# Patient Record
Sex: MALE | Race: BLACK OR AFRICAN AMERICAN | Employment: OTHER | URBAN - METROPOLITAN AREA
[De-identification: names, ages, dates, MRNs, and addresses within clinical notes are randomized per-mention and may not be internally consistent; named-entity substitution may affect disease eponyms.]

---

## 2022-04-21 ENCOUNTER — APPOINTMENT (OUTPATIENT)
Dept: CT IMAGING | Age: 87
DRG: 065 | End: 2022-04-21
Attending: EMERGENCY MEDICINE
Payer: MEDICARE

## 2022-04-21 ENCOUNTER — APPOINTMENT (OUTPATIENT)
Dept: GENERAL RADIOLOGY | Age: 87
DRG: 065 | End: 2022-04-21
Attending: EMERGENCY MEDICINE
Payer: MEDICARE

## 2022-04-21 ENCOUNTER — APPOINTMENT (OUTPATIENT)
Dept: CT IMAGING | Age: 87
DRG: 065 | End: 2022-04-21
Attending: INTERNAL MEDICINE
Payer: MEDICARE

## 2022-04-21 ENCOUNTER — APPOINTMENT (OUTPATIENT)
Dept: MRI IMAGING | Age: 87
DRG: 065 | End: 2022-04-21
Attending: INTERNAL MEDICINE
Payer: MEDICARE

## 2022-04-21 ENCOUNTER — HOSPITAL ENCOUNTER (INPATIENT)
Age: 87
LOS: 4 days | Discharge: NURSING, REHAB OR LONG-TERM CARE | DRG: 065 | End: 2022-04-25
Attending: EMERGENCY MEDICINE | Admitting: INTERNAL MEDICINE
Payer: MEDICARE

## 2022-04-21 DIAGNOSIS — R47.1 DYSARTHRIA: ICD-10-CM

## 2022-04-21 DIAGNOSIS — R73.09 ELEVATED HEMOGLOBIN A1C: ICD-10-CM

## 2022-04-21 DIAGNOSIS — E78.5 HYPERLIPIDEMIA, UNSPECIFIED HYPERLIPIDEMIA TYPE: ICD-10-CM

## 2022-04-21 DIAGNOSIS — I63.9 ACUTE ISCHEMIC STROKE (HCC): Primary | ICD-10-CM

## 2022-04-21 DIAGNOSIS — R53.1 RIGHT SIDED WEAKNESS: ICD-10-CM

## 2022-04-21 DIAGNOSIS — I10 PRIMARY HYPERTENSION: ICD-10-CM

## 2022-04-21 DIAGNOSIS — R47.81 SLURRED SPEECH: ICD-10-CM

## 2022-04-21 DIAGNOSIS — I63.81 BASAL GANGLIA INFARCTION (HCC): ICD-10-CM

## 2022-04-21 LAB
ALBUMIN SERPL-MCNC: 3.3 G/DL (ref 3.5–5)
ALBUMIN/GLOB SERPL: 0.8 {RATIO} (ref 1.1–2.2)
ALP SERPL-CCNC: 92 U/L (ref 45–117)
ALT SERPL-CCNC: 16 U/L (ref 12–78)
ANION GAP SERPL CALC-SCNC: 5 MMOL/L (ref 5–15)
APTT PPP: 26.1 SEC (ref 22.1–31)
AST SERPL-CCNC: 12 U/L (ref 15–37)
BASOPHILS # BLD: 0 K/UL (ref 0–0.1)
BASOPHILS NFR BLD: 1 % (ref 0–1)
BILIRUB SERPL-MCNC: 0.4 MG/DL (ref 0.2–1)
BUN SERPL-MCNC: 18 MG/DL (ref 6–20)
BUN/CREAT SERPL: 13 (ref 12–20)
CALCIUM SERPL-MCNC: 8.7 MG/DL (ref 8.5–10.1)
CHLORIDE SERPL-SCNC: 106 MMOL/L (ref 97–108)
CO2 SERPL-SCNC: 25 MMOL/L (ref 21–32)
CREAT SERPL-MCNC: 1.36 MG/DL (ref 0.7–1.3)
DIFFERENTIAL METHOD BLD: NORMAL
EOSINOPHIL # BLD: 0.1 K/UL (ref 0–0.4)
EOSINOPHIL NFR BLD: 1 % (ref 0–7)
ERYTHROCYTE [DISTWIDTH] IN BLOOD BY AUTOMATED COUNT: 14.2 % (ref 11.5–14.5)
GLOBULIN SER CALC-MCNC: 4.2 G/DL (ref 2–4)
GLUCOSE SERPL-MCNC: 185 MG/DL (ref 65–100)
HCT VFR BLD AUTO: 43.8 % (ref 36.6–50.3)
HGB BLD-MCNC: 13.2 G/DL (ref 12.1–17)
IMM GRANULOCYTES # BLD AUTO: 0 K/UL (ref 0–0.04)
IMM GRANULOCYTES NFR BLD AUTO: 0 % (ref 0–0.5)
INR PPP: 1 (ref 0.9–1.1)
LYMPHOCYTES # BLD: 1.7 K/UL (ref 0.8–3.5)
LYMPHOCYTES NFR BLD: 28 % (ref 12–49)
MCH RBC QN AUTO: 26 PG (ref 26–34)
MCHC RBC AUTO-ENTMCNC: 30.1 G/DL (ref 30–36.5)
MCV RBC AUTO: 86.4 FL (ref 80–99)
MONOCYTES # BLD: 0.5 K/UL (ref 0–1)
MONOCYTES NFR BLD: 9 % (ref 5–13)
NEUTS SEG # BLD: 3.7 K/UL (ref 1.8–8)
NEUTS SEG NFR BLD: 61 % (ref 32–75)
NRBC # BLD: 0 K/UL (ref 0–0.01)
NRBC BLD-RTO: 0 PER 100 WBC
PLATELET # BLD AUTO: 173 K/UL (ref 150–400)
PMV BLD AUTO: 9.8 FL (ref 8.9–12.9)
POTASSIUM SERPL-SCNC: 3.3 MMOL/L (ref 3.5–5.1)
PROT SERPL-MCNC: 7.5 G/DL (ref 6.4–8.2)
PROTHROMBIN TIME: 10.6 SEC (ref 9–11.1)
RBC # BLD AUTO: 5.07 M/UL (ref 4.1–5.7)
SODIUM SERPL-SCNC: 136 MMOL/L (ref 136–145)
THERAPEUTIC RANGE,PTTT: NORMAL SECS (ref 58–77)
TSH SERPL DL<=0.05 MIU/L-ACNC: 0.57 UIU/ML (ref 0.36–3.74)
WBC # BLD AUTO: 6.1 K/UL (ref 4.1–11.1)

## 2022-04-21 PROCEDURE — 74011000636 HC RX REV CODE- 636: Performed by: RADIOLOGY

## 2022-04-21 PROCEDURE — 99285 EMERGENCY DEPT VISIT HI MDM: CPT

## 2022-04-21 PROCEDURE — 85730 THROMBOPLASTIN TIME PARTIAL: CPT

## 2022-04-21 PROCEDURE — 93005 ELECTROCARDIOGRAM TRACING: CPT

## 2022-04-21 PROCEDURE — A9576 INJ PROHANCE MULTIPACK: HCPCS

## 2022-04-21 PROCEDURE — 4A03X5D MEASUREMENT OF ARTERIAL FLOW, INTRACRANIAL, EXTERNAL APPROACH: ICD-10-PCS | Performed by: RADIOLOGY

## 2022-04-21 PROCEDURE — 85610 PROTHROMBIN TIME: CPT

## 2022-04-21 PROCEDURE — 84443 ASSAY THYROID STIM HORMONE: CPT

## 2022-04-21 PROCEDURE — 70553 MRI BRAIN STEM W/O & W/DYE: CPT

## 2022-04-21 PROCEDURE — 70450 CT HEAD/BRAIN W/O DYE: CPT

## 2022-04-21 PROCEDURE — 71046 X-RAY EXAM CHEST 2 VIEWS: CPT

## 2022-04-21 PROCEDURE — 70496 CT ANGIOGRAPHY HEAD: CPT

## 2022-04-21 PROCEDURE — 74011250636 HC RX REV CODE- 250/636: Performed by: INTERNAL MEDICINE

## 2022-04-21 PROCEDURE — 74011250636 HC RX REV CODE- 250/636

## 2022-04-21 PROCEDURE — 80053 COMPREHEN METABOLIC PANEL: CPT

## 2022-04-21 PROCEDURE — 65270000046 HC RM TELEMETRY

## 2022-04-21 PROCEDURE — 85025 COMPLETE CBC W/AUTO DIFF WBC: CPT

## 2022-04-21 RX ORDER — LORAZEPAM 2 MG/ML
0.5 INJECTION INTRAMUSCULAR
Status: COMPLETED | OUTPATIENT
Start: 2022-04-21 | End: 2022-04-21

## 2022-04-21 RX ORDER — ACETAMINOPHEN 650 MG/1
650 SUPPOSITORY RECTAL
Status: DISCONTINUED | OUTPATIENT
Start: 2022-04-21 | End: 2022-04-25 | Stop reason: HOSPADM

## 2022-04-21 RX ORDER — POLYETHYLENE GLYCOL 3350 17 G/17G
17 POWDER, FOR SOLUTION ORAL DAILY PRN
Status: DISCONTINUED | OUTPATIENT
Start: 2022-04-21 | End: 2022-04-25 | Stop reason: HOSPADM

## 2022-04-21 RX ORDER — LORAZEPAM 2 MG/ML
0.5 INJECTION INTRAMUSCULAR
Status: CANCELLED | OUTPATIENT
Start: 2022-04-21 | End: 2022-04-22

## 2022-04-21 RX ORDER — ACETAMINOPHEN 325 MG/1
650 TABLET ORAL
Status: DISCONTINUED | OUTPATIENT
Start: 2022-04-21 | End: 2022-04-25 | Stop reason: HOSPADM

## 2022-04-21 RX ORDER — INSULIN LISPRO 100 [IU]/ML
INJECTION, SOLUTION INTRAVENOUS; SUBCUTANEOUS
Status: DISCONTINUED | OUTPATIENT
Start: 2022-04-21 | End: 2022-04-25 | Stop reason: HOSPADM

## 2022-04-21 RX ORDER — ATORVASTATIN CALCIUM 40 MG/1
40 TABLET, FILM COATED ORAL
Status: DISCONTINUED | OUTPATIENT
Start: 2022-04-21 | End: 2022-04-25 | Stop reason: HOSPADM

## 2022-04-21 RX ORDER — GUAIFENESIN 100 MG/5ML
81 LIQUID (ML) ORAL DAILY
Status: DISCONTINUED | OUTPATIENT
Start: 2022-04-22 | End: 2022-04-25 | Stop reason: HOSPADM

## 2022-04-21 RX ORDER — MAGNESIUM SULFATE 100 %
4 CRYSTALS MISCELLANEOUS AS NEEDED
Status: DISCONTINUED | OUTPATIENT
Start: 2022-04-21 | End: 2022-04-25 | Stop reason: HOSPADM

## 2022-04-21 RX ADMIN — IOPAMIDOL 100 ML: 755 INJECTION, SOLUTION INTRAVENOUS at 18:14

## 2022-04-21 RX ADMIN — GADOTERIDOL 15 ML: 279.3 INJECTION, SOLUTION INTRAVENOUS at 18:41

## 2022-04-21 RX ADMIN — LORAZEPAM 0.5 MG: 2 INJECTION INTRAMUSCULAR; INTRAVENOUS at 18:21

## 2022-04-21 NOTE — H&P
[address] [facility] Adult  Hospitalist Group  History and Physical    Date of Service:  4/21/2022  Primary Care Provider: John, MD Stevie  Source of information: The patient    Chief Complaint: Dysarthria and Facial Droop      History of Presenting Illness:   Cira Adamson. Ariel Echevarria with past medical history SBO, right eye blindness, presenting to [facility] with 2 day history slurred speech and right sided deficits. History is limited due to patient's speech issues. He is able to state that is symptoms started 2 days prior. He has had limited appetite. He reports that he was unable to ambulate to the car. EMS was notified. In the ED, CT head without acute findings. He was not a candidate for tPA due to symptom onset. Hospitalist was consulted for further admission. REVIEW OF SYSTEMS:  Unable to complete accurate review of systems due to patient's neurological changes      Past Medical History:   Diagnosis Date    Blindness of right eye     Small bowel obstruction ([address]       Surgical History:   -Exploratory laparotomy due to small bowel obstruction      Prior to Admission medications    Medication Sig Start Date End Date Taking? Authorizing Provider   HYDROcodone-acetaminophen (LORTAB) 5-500 mg per tablet Take 1 Tab by mouth every four (4) hours as needed for Pain for 10 doses. 5/31/12   PHILIPP Castañeda     No Known Allergies   No family history on file. - unable to obtain   Social History:   Denies smoking, alcohol 1-2 times per month   Family and social history were personally reviewed, all pertinent and relevant details are outlined as above.     Objective:     Visit Vitals  BP (!) 141/71   Pulse 69   Temp 97.8 °F (36.6 °C)   Resp 22   Ht 5' 8\" (1.727 m)   Wt 89.3 kg (196 lb 13.9 oz)   SpO2 98%   BMI 29.93 kg/m²      O2 Device: None (Room air)    PHYSICAL EXAM:   General:  Awake, no acute distress, resting in bed, pleasant male/female, appears to be stated age  HEENT: PEERL, EOMI, moist mucus membranes  Neck: Supple, no JVD, no meningeal signs  Chest: Clear to auscultation bilaterally, no increased work of breathing   CVS: RRR, S1 S2 heard, no murmurs/rubs/gallops  Abd: Soft, non-tender, non-distended, +bowel sounds   Ext: No clubbing, no cyanosis, no edema  Neuro/Psych: obvious slurred speech, right upper extremity 3/5 strength, right lower extremity 3/5 strength, left upper and lower extremity 5/5 strength   Cap refill: Brisk, less than 3 seconds  Pulses: 2+, symmetric in all extremities  Skin: Warm, dry, without rashes or lesions    Data Review: All diagnostic labs and studies have been reviewed. Abnormal Labs Reviewed   METABOLIC PANEL, COMPREHENSIVE - Abnormal; Notable for the following components:       Result Value    Potassium 3.3 (*)     Glucose 185 (*)     Creatinine 1.36 (*)     GFR est non-AA 50 (*)     AST (SGOT) 12 (*)     Albumin 3.3 (*)     Globulin 4.2 (*)     A-G Ratio 0.8 (*)     All other components within normal limits       IMAGING:   XR CHEST PA LAT   Final Result   No acute cardiopulmonary disease. CT HEAD WO CONT   Final Result    impression: No acute intracranial findings. MRI BRAIN W WO CONT    (Results Pending)        ECG/ECHO:    Results for orders placed or performed during the hospital encounter of 04/21/22   EKG, 12 LEAD, INITIAL   Result Value Ref Range    Ventricular Rate 62 BPM    Atrial Rate 62 BPM    P-R Interval 356 ms    QRS Duration 82 ms    Q-T Interval 390 ms    QTC Calculation (Bezet) 395 ms    Calculated P Axis 78 degrees    Calculated R Axis -5 degrees    Calculated T Axis -46 degrees    Diagnosis       Sinus rhythm with 1st degree AV block with premature atrial complexes  Nonspecific T wave abnormality  Abnormal ECG  No previous ECGs available          Assessment:   Given the patient's current clinical presentation, there is a high level of concern for decompensation if discharged from the emergency department.  Complex decision making was performed, which includes reviewing the patient's available past medical records, laboratory results, and imaging studies. Active Problems:    Slurred speech (4/21/2022)      Plan:     Slurred speech, right sided weakness:  -concern for acute CVA  -CT noncontrast head negative  -CTA head/neck ordered and pending  -MRI brain ordered  -monitor on telemetry  -neurovascular checks  -echocardiogram   -bedside swallow evaluation, speech   -PT/OT  -LDL, Ha1c   -aspirin, atorvastatin     Elevated glucose:   -check a1c, empiric SSI    Acute renal insufficiency:   -gentle fluids  -monitor closely     Needs medications officially reconciled       DIET: DIET NPO   ISOLATION PRECAUTIONS: There are currently no Active Isolations  CODE STATUS: Full Code   DVT PROPHYLAXIS: SCDs  FUNCTIONAL STATUS PRIOR TO HOSPITALIZATION: Fully active and ambulatory; able to carry on all self-care without restriction. EARLY MOBILITY ASSESSMENT: Recommend an assessment from physical therapy and/or occupational therapy  ANTICIPATED DISCHARGE: 24-48 hours. EMERGENCY CONTACT/SURROGATE DECISION MAKER: Travis Maldonado. Cell: [phone]    Updated emergency contact, Travis Maldonado, on plan for admission         Signed By: Nena Joseph DO     April 21, 2022         Please note that this dictation may have been completed with Elin Hayes, the computer voice recognition software. Quite often unanticipated grammatical, syntax, homophones, and other interpretive errors are inadvertently transcribed by the computer software. Please disregard these errors. Please excuse any errors that have escaped final proofreading.

## 2022-04-21 NOTE — ED NOTES
Bedside shift change report given to Boris Chopra (oncoming nurse) by Manolo De La Cruz (offgoing nurse). Report included the following information SBAR, Kardex, ED Summary and MAR.

## 2022-04-21 NOTE — ED TRIAGE NOTES
Pt arrives with EMS from [facility] for c/o slurred speech, right sided weakness and right sided facial droop. Per patient, he noticed his slurred speech 2 days ago. He told his friend today he was concerned he had a stroke so they called EMS. . Pt taken directly to CT on arrival    Cancelled code S per Dr. Angelica Adamson.

## 2022-04-21 NOTE — ED PROVIDER NOTES
Mr. Avery Bernard is an 79yo male who presents to the ER with slurred speech and weakness. He says her symptoms started 2 days ago, on Tuesday. He began having slurred speech. He has had slurred speech then that has never improved. He was continued to have generalized weakness since this occurred. He said that he feels weak all over and cannot tell if 1 side is weaker than the other. Apparently, he did notice that his face was drooping so at home. He had difficulty walking today. He attempted to walk but started to fall. He said that he caught himself and did not actually fall to the ground. He denies any pain. No chest pain or trouble breathing. No pain in his belly. No changes with his urine or bowel movements. He denies similar symptoms in the past.  He ended up calling a friend today to tell him about his symptoms because he wanted the friend to bring him to the emergency room. Instead, EMS was called. No past medical history on file. No past surgical history on file. No family history on file. Social History     Socioeconomic History    Marital status: SINGLE     Spouse name: Not on file    Number of children: Not on file    Years of education: Not on file    Highest education level: Not on file   Occupational History    Not on file   Tobacco Use    Smoking status: Not on file    Smokeless tobacco: Not on file   Substance and Sexual Activity    Alcohol use: Not on file    Drug use: Not on file    Sexual activity: Not on file   Other Topics Concern    Not on file   Social History Narrative    Not on file     Social Determinants of Health     Financial Resource Strain:     Difficulty of Paying Living Expenses: Not on file   Food Insecurity:     Worried About Running Out of Food in the Last Year: Not on file    Taylor of Food in the Last Year: Not on file   Transportation Needs:     Lack of Transportation (Medical):  Not on file    Lack of Transportation (Non-Medical): Not on file   Physical Activity:     Days of Exercise per Week: Not on file    Minutes of Exercise per Session: Not on file   Stress:     Feeling of Stress : Not on file   Social Connections:     Frequency of Communication with Friends and Family: Not on file    Frequency of Social Gatherings with Friends and Family: Not on file    Attends Zoroastrian Services: Not on file    Active Member of [address] [place] [address] or Organizations: Not on file    Attends Club or Organization Meetings: Not on file    Marital Status: Not on file   Intimate Partner Violence:     Fear of Current or Ex-Partner: Not on file    Emotionally Abused: Not on file    Physically Abused: Not on file    Sexually Abused: Not on file   Housing Stability:     Unable to Pay for Housing in the Last Year: Not on file    Number of [place] in the Last Year: Not on file    Unstable Housing in the Last Year: Not on file         ALLERGIES: Patient has no known allergies. Review of Systems   Constitutional: Negative for chills and fever. HENT: Negative for rhinorrhea and sore throat. Respiratory: Negative for cough and shortness of breath. Cardiovascular: Negative for chest pain. Gastrointestinal: Negative for abdominal pain, diarrhea, nausea and vomiting. Genitourinary: Negative for dysuria and hematuria. Musculoskeletal: Negative for arthralgias and myalgias. Skin: Negative for pallor and rash. Neurological: Positive for speech difficulty and weakness. Negative for dizziness and light-headedness. All other systems reviewed and are negative. There were no vitals filed for this visit. Physical Exam     Vital signs reviewed. Nursing notes reviewed.     Const:  No acute distress, well developed, well nourished  Head:  Atraumatic, normocephalic  Eyes:  PERRL, conjunctiva normal, no scleral icterus  Neck:  Supple, trachea midline  Cardiovascular: Regular rate  Resp:  No resp distress, no increased work of breathing  Abd: Soft, non-tender, non-distended  MSK:  No pedal edema, normal ROM  Neuro:  Alert and awake, no cranial nerve defect, very minimal right-sided facial droop, slurred speech, 4+ out of 5 right upper and right lower extremity strength, 5 out of 5 left upper and left lower extremity strength  Skin:  Warm, dry, intact  Psych: normal mood and affect, behavior is normal, judgement and thought content is normal          MDM  Number of Diagnoses or Management Options     Amount and/or Complexity of Data Reviewed  Clinical lab tests: ordered and reviewed  Tests in the radiology section of CPT®: ordered and reviewed  Review and summarize past medical records: yes    Patient Progress  Patient progress: stable         Procedures        Perfect Serve Consult for Admission  4:32 PM    ED Room Number: ER06/06  Patient Name and age:  Arnold Brooks 80 y.o.  male  Working Diagnosis:   1. Acute ischemic stroke ([address]    2. Slurred speech    3. Right sided weakness        COVID-19 Suspicion:  no  Sepsis present:  no  Reassessment needed: no  Readmission: no  Isolation Requirements:  no  Recommended Level of Care:  telemetry  Department:[facility] Adult ED - [phone]       Mr. Lilly Francis is an 79yo male who presents to the ER with complaints of slurred speech and right sided weakness. No mass or bleed on head CT. Last normal was 2 days ago. Pt. Is not a candidate for tpa or thrombectomy. Pt.  To be evaluated for admission by the hospitalist.

## 2022-04-22 ENCOUNTER — APPOINTMENT (OUTPATIENT)
Dept: NON INVASIVE DIAGNOSTICS | Age: 87
DRG: 065 | End: 2022-04-22
Attending: INTERNAL MEDICINE
Payer: MEDICARE

## 2022-04-22 LAB
ANION GAP SERPL CALC-SCNC: 9 MMOL/L (ref 5–15)
BUN SERPL-MCNC: 18 MG/DL (ref 6–20)
BUN/CREAT SERPL: 18 (ref 12–20)
CALCIUM SERPL-MCNC: 8.7 MG/DL (ref 8.5–10.1)
CHLORIDE SERPL-SCNC: 104 MMOL/L (ref 97–108)
CHOLEST SERPL-MCNC: 205 MG/DL
CO2 SERPL-SCNC: 25 MMOL/L (ref 21–32)
CREAT SERPL-MCNC: 1.02 MG/DL (ref 0.7–1.3)
ECHO AO ROOT DIAM: 3.3 CM
ECHO AO ROOT INDEX: 1.63 CM/M2
ECHO AV AREA PEAK VELOCITY: 1.5 CM2
ECHO AV AREA/BSA PEAK VELOCITY: 0.7 CM2/M2
ECHO AV PEAK GRADIENT: 10 MMHG
ECHO AV PEAK VELOCITY: 1.6 M/S
ECHO AV VELOCITY RATIO: 0.5
ECHO LA DIAMETER INDEX: 2.41 CM/M2
ECHO LA DIAMETER: 4.9 CM
ECHO LA TO AORTIC ROOT RATIO: 1.48
ECHO LV E' LATERAL VELOCITY: 9 CM/S
ECHO LV E' SEPTAL VELOCITY: 6 CM/S
ECHO LV FRACTIONAL SHORTENING: 32 % (ref 28–44)
ECHO LV INTERNAL DIMENSION DIASTOLE INDEX: 2.17 CM/M2
ECHO LV INTERNAL DIMENSION DIASTOLIC: 4.4 CM (ref 4.2–5.9)
ECHO LV INTERNAL DIMENSION SYSTOLIC INDEX: 1.48 CM/M2
ECHO LV INTERNAL DIMENSION SYSTOLIC: 3 CM
ECHO LV IVSD: 1 CM (ref 0.6–1)
ECHO LV MASS 2D: 147.8 G (ref 88–224)
ECHO LV MASS INDEX 2D: 72.8 G/M2 (ref 49–115)
ECHO LV POSTERIOR WALL DIASTOLIC: 1 CM (ref 0.6–1)
ECHO LV RELATIVE WALL THICKNESS RATIO: 0.45
ECHO LVOT AREA: 2.8 CM2
ECHO LVOT DIAM: 1.9 CM
ECHO LVOT PEAK GRADIENT: 3 MMHG
ECHO LVOT PEAK VELOCITY: 0.8 M/S
ECHO MV A VELOCITY: 0.76 M/S
ECHO MV AREA PHT: 1.8 CM2
ECHO MV E DECELERATION TIME (DT): 430 MS
ECHO MV E VELOCITY: 0.56 M/S
ECHO MV E/A RATIO: 0.74
ECHO MV E/E' LATERAL: 6.22
ECHO MV E/E' RATIO (AVERAGED): 7.78
ECHO MV E/E' SEPTAL: 9.33
ECHO MV PRESSURE HALF TIME (PHT): 124.7 MS
EST. AVERAGE GLUCOSE BLD GHB EST-MCNC: 166 MG/DL
GLUCOSE BLD STRIP.AUTO-MCNC: 131 MG/DL (ref 65–117)
GLUCOSE BLD STRIP.AUTO-MCNC: 138 MG/DL (ref 65–117)
GLUCOSE BLD STRIP.AUTO-MCNC: 149 MG/DL (ref 65–117)
GLUCOSE BLD STRIP.AUTO-MCNC: 178 MG/DL (ref 65–117)
GLUCOSE SERPL-MCNC: 130 MG/DL (ref 65–100)
HBA1C MFR BLD: 7.4 % (ref 4–5.6)
HDLC SERPL-MCNC: 53 MG/DL
HDLC SERPL: 3.9 {RATIO} (ref 0–5)
LDLC SERPL CALC-MCNC: 139.2 MG/DL (ref 0–100)
POTASSIUM SERPL-SCNC: 4 MMOL/L (ref 3.5–5.1)
SERVICE CMNT-IMP: ABNORMAL
SODIUM SERPL-SCNC: 138 MMOL/L (ref 136–145)
TRIGL SERPL-MCNC: 64 MG/DL (ref ?–150)
VLDLC SERPL CALC-MCNC: 12.8 MG/DL

## 2022-04-22 PROCEDURE — 99223 1ST HOSP IP/OBS HIGH 75: CPT | Performed by: PSYCHIATRY & NEUROLOGY

## 2022-04-22 PROCEDURE — 82962 GLUCOSE BLOOD TEST: CPT

## 2022-04-22 PROCEDURE — 80048 BASIC METABOLIC PNL TOTAL CA: CPT

## 2022-04-22 PROCEDURE — 74011636637 HC RX REV CODE- 636/637: Performed by: INTERNAL MEDICINE

## 2022-04-22 PROCEDURE — 36415 COLL VENOUS BLD VENIPUNCTURE: CPT

## 2022-04-22 PROCEDURE — 92522 EVALUATE SPEECH PRODUCTION: CPT | Performed by: SPEECH-LANGUAGE PATHOLOGIST

## 2022-04-22 PROCEDURE — 97530 THERAPEUTIC ACTIVITIES: CPT

## 2022-04-22 PROCEDURE — 92610 EVALUATE SWALLOWING FUNCTION: CPT | Performed by: SPEECH-LANGUAGE PATHOLOGIST

## 2022-04-22 PROCEDURE — 97161 PT EVAL LOW COMPLEX 20 MIN: CPT

## 2022-04-22 PROCEDURE — 80061 LIPID PANEL: CPT

## 2022-04-22 PROCEDURE — 83036 HEMOGLOBIN GLYCOSYLATED A1C: CPT

## 2022-04-22 PROCEDURE — 65270000046 HC RM TELEMETRY

## 2022-04-22 PROCEDURE — 93306 TTE W/DOPPLER COMPLETE: CPT | Performed by: SPECIALIST

## 2022-04-22 PROCEDURE — 97116 GAIT TRAINING THERAPY: CPT

## 2022-04-22 PROCEDURE — 74011250637 HC RX REV CODE- 250/637: Performed by: INTERNAL MEDICINE

## 2022-04-22 PROCEDURE — 97165 OT EVAL LOW COMPLEX 30 MIN: CPT

## 2022-04-22 PROCEDURE — 93306 TTE W/DOPPLER COMPLETE: CPT

## 2022-04-22 RX ADMIN — ASPIRIN 81 MG CHEWABLE TABLET 81 MG: 81 TABLET CHEWABLE at 08:55

## 2022-04-22 RX ADMIN — Medication 2 UNITS: at 11:58

## 2022-04-22 RX ADMIN — ATORVASTATIN CALCIUM 40 MG: 40 TABLET, FILM COATED ORAL at 23:05

## 2022-04-22 RX ADMIN — ATORVASTATIN CALCIUM 40 MG: 40 TABLET, FILM COATED ORAL at 00:57

## 2022-04-22 NOTE — PROGRESS NOTES
Problem: Self Care Deficits Care Plan (Adult)  Goal: *Acute Goals and Plan of Care (Insert Text)  Description: FUNCTIONAL STATUS PRIOR TO ADMISSION: pt lives alone, retired, independent    HOME SUPPORT: lives alone    Occupational Therapy Goals  Initiated 4/22/2022   1. Patient will perform standing ADLs at sink with minimal assistance/contact guard assist within 7 day(s). 2.  Patient will perform bathing with minimal assistance/contact guard assist within 7 day(s). 3.  Patient will perform upper body dressing and lower body dressing with minimal assistance/contact guard assist within 7 day(s). 4.  Patient will perform toilet transfers with minimal assistance/contact guard assist within 7 day(s). 5.  Patient will perform all aspects of toileting with min A within 7 day(s). 6.  Patient will improve their Fugl Ramirez score by 5 points in prep for ADLs within 7 days. Outcome: Progressing Towards Goal       OCCUPATIONAL THERAPY EVALUATION  Patient: Timothy Platt. Queenie Rubalcava [de-identified]80 y.o. male)  Date: 4/22/2022  Primary Diagnosis: Slurred speech [R47.81]        Precautions: fall, bed alarm       ASSESSMENT  Based on the objective data described below, the patient presents with R UE weakness, dysarthria, impaired coordination bilaterally, impaired balance, risk for falls and decline in functional status s/p admission for acute CVA. Imaging positive for 2 small foci of acute infarction in the left basal ganglia. He required min A x 1 for supine to sit, min A to stand to transfer to chair. Pt is blind in R eye at baseline. Overall, he is needing increased A for ADLs, mobility and balance due to R side weakness. Recommend IPR at this time as pt lives alone. Current Level of Function Impacting Discharge (ADLs/self-care): min A transfers, min to total A ADLs    Functional Outcome Measure:   The patient scored Total A-D  Total A-D (Motor Function): 52/66 on the Fugl-Ramirez Assessment which is indicative of mild impairment in upper extremity functional status. Other factors to consider for discharge: lives alone     Patient will benefit from skilled therapy intervention to address the above noted impairments. PLAN :  Recommendations and Planned Interventions: self care training, functional mobility training, therapeutic exercise, balance training, therapeutic activities, endurance activities, neuromuscular re-education, patient education, and home safety training    Frequency/Duration: Patient will be followed by occupational therapy 5 times a week to address goals. Recommendation for discharge: (in order for the patient to meet his/her long term goals)  Therapy 3 hours per day 5-7 days per week    This discharge recommendation:  Has not yet been discussed the attending provider and/or case management    IF patient discharges home will need the following DME: TBD       SUBJECTIVE:   Patient stated I can get up.     OBJECTIVE DATA SUMMARY:   HISTORY:   Past Medical History:   Diagnosis Date    Blindness of right eye     Small bowel obstruction ([facility] [address]    No past surgical history on file.   Expanded or extensive additional review of patient history:     Home Situation  Home Environment: Private residence  # Steps to Enter: 3  Rails to Enter: Yes  Hand Rails : Bilateral  One/Two Story Residence: One story  Living Alone: Yes  Support Systems: Friend/Neighbor  Patient Expects to be Discharged to[de-identified] Other: (TBD-home vs IPR)  Current DME Used/Available at Home: Cane, straight  Tub or Shower Type: Tub/Shower combination    Hand dominance: Left    EXAMINATION OF PERFORMANCE DEFICITS:  Cognitive/Behavioral Status:  Neurologic State: Alert  Orientation Level: Oriented to person;Oriented to place;Oriented to time  Cognition: Follows commands     Perseveration: No perseveration noted  Safety/Judgement: Awareness of environment    Skin: intact    Edema: none noted    Hearing:       Vision/Perceptual:                           Acuity:  (Blind R eye at baseline)         Range of Motion:    AROM: Generally decreased, functional                         Strength:    Strength: Generally decreased, functional (mild weakness R>L  )                Coordination:  Coordination: Generally decreased, functional (mildly impaired R v L with heel to shin)  Fine Motor Skills-Upper: Left Impaired;Right Impaired (tremors L hand, dysmetria R hand)    Gross Motor Skills-Upper: Left Intact; Right Intact    Tone & Sensation:    Tone: Normal  Sensation: Intact (light touch intact)                      Balance:  Sitting: Impaired  Sitting - Static: Good (unsupported)  Sitting - Dynamic: Fair (occasional)  Standing: Impaired; With support  Standing - Static: Fair;Constant support  Standing - Dynamic : Fair;Constant support    Functional Mobility and Transfers for ADLs:  Bed Mobility:  Supine to Sit: Minimum assistance    Scooting: Contact guard assistance; Additional time (for scoot to EOB)    Transfers:  Sit to Stand: Minimum assistance  Stand to Sit: Minimum assistance  Bed to Chair: Minimum assistance (stand pivot)    ADL Assessment:  Feeding: Minimum assistance    Oral Facial Hygiene/Grooming: Minimum assistance    Bathing: Moderate assistance    Upper Body Dressing: Moderate assistance    Lower Body Dressing: Moderate assistance    Toileting:  Total assistance (hagan)                ADL Intervention and task modifications:                                     Cognitive Retraining  Safety/Judgement: Awareness of environment      Functional Measure:  Fugl-Ramirez Assessment of Motor Recovery after Stroke:   Reflex Activity  Flexors/Biceps/Fingers: Can be elicited  Extensors/Triceps: Can be elicited  Reflex Subtotal: 4    Volitional Movement Within Synergies  Shoulder Retraction: Full  Shoulder Elevation: Full  Shoulder Abduction (90 degrees): Partial  Shoulder External Rotation: Full  Elbow Flexion: Full  Forearm Supination: Partial  Shoulder Adduction/Internal Rotation: Full  Elbow Extension: Full  Forearm Pronation: Full  Subtotal: 16    Volitional Movement Mixing Synergies  Hand to Lumbar Spine: Full  Shoulder Flexion (0-90 degrees): Partial  Pronation-Supination: Partial  Subtotal: 4    Volitional Movement With Little or No Synergy  Shoulder Abduction (0-90 degrees): Partial  Shoulder Flexion ( degrees): Partial  Pronation/Supination: Partial  Subtotal : 3    Normal Reflex Activity  Biceps, Triceps, Finger Flexors: Full  Subtotal : 2    Upper Extremity Total   Upper Extremity Total: 29    Wrist  Stability at 15 Degree Dorsiflexion: Partial  Repeated Dorsiflexion/ Volar Flexion: Partial  Stability at 15 Degree Dorsiflexion: Partial  Repeated Dorsiflexion/ Volar Flexion: Partial  Circumduction: Full  Wrist Total: 6    Hand  Mass Flexion: Full  Mass Extension: Full  Grasp A: Full  Grasp B: Full  Grasp C: Full  Grasp D: Full  Grasp E: Full  Hand Total: 14    Coordination/Speed  Tremor: Slight  Dysmetria: Slight  Time: 2-5s  Coordination/Speed Total : 3    Total A-D  Total A-D (Motor Function): 52/66     This is a reliable/valid measure of arm function after a neurological event. It has established value to characterize functional status and for measuring spontaneous and therapy-induced recovery; tests proximal and distal motor functions. Fugl-Ramirez Assessment - UE scores recorded between five and 30 days post neurologic event can be used to predict UE recovery at six months post neurologic event. Severe = 0-21 points   Moderately Severe = 22-33 points   Moderate = 34-47 points   Mild = 48-66 points  MILADY Garcia, SILVIA Natarajan, & BROOKE Benitez (1992). Measurement of motor recovery after stroke: Outcome assessment and sample size requirements.  Stroke, 23, pp. 1031-9739.   ------------------------------------------------------------------------------------------------------------------------------------------------------------------  MCID:  Stroke:   Josef Nelson, 2001; n = 171; mean age 79 (5) years; assessed within 16 (12) days of stroke, Acute Stroke)  FMA Motor Scores from Admission to Discharge   10 point increase in FMA Upper Extremity = 1.5 change in discharge FIM   10 point increase in FMA Lower Extremity = 1.9 change in discharge FIM  MDC:   Stroke:   Daniela Goldstein et al, 2008, n = 14, mean age = 59.9 (14.6) years, assessed on average 14 (6.5) months post stroke, Chronic Stroke)   FMA = 5.2 points for the Upper Extremity portion of the assessment     Occupational Therapy Evaluation Charge Determination   History Examination Decision-Making   LOW Complexity : Brief history review  LOW Complexity : 1-3 performance deficits relating to physical, cognitive , or psychosocial skils that result in activity limitations and / or participation restrictions  LOW Complexity : No comorbidities that affect functional and no verbal or physical assistance needed to complete eval tasks       Based on the above components, the patient evaluation is determined to be of the following complexity level: LOW   Pain Rating:  none    Activity Tolerance:   Good    After treatment patient left in no apparent distress:    Sitting in chair, Call bell within reach, and Bed / chair alarm activated    COMMUNICATION/EDUCATION:   The patients plan of care was discussed with: Physical therapist and Registered nurse. Patient was educated regarding his deficit(s) of R side weakness, dyarthria as this relates to his diagnosis of CVA. He demonstrated Good understanding as evidenced by verbal indication. Patient/family have participated as able in goal setting and plan of care. This patients plan of care is appropriate for delegation to [facility].     Thank you for this referral.  Dawne Severe, OT  Time Calculation: 15 mins

## 2022-04-22 NOTE — PROGRESS NOTES
Bedside shift change report given to Adan/RN (oncoming nurse) by Esthela/LPN (offgoing nurse). Report included the following information SBAR, Kardex, ED Summary, Procedure Summary, Intake/Output, MAR, Recent Results, Med Rec Status, Pre Procedure Checklist, Procedure Verification, Quality Measures and Dual Neuro Assessment.

## 2022-04-22 NOTE — PROGRESS NOTES
[address] [facility]  Hospitalist Group                                                                                          Hospitalist Progress Note  [facility], DO  Answering service: [phone] from in house phone        Date of Service:  2022  NAME:  Darvin Alexandre  :  1935  MRN:  [id number]      Admission Summary:   Darvin Alexandre with past medical history SBO, right eye blindness, presenting to [address] [place] [address] with 2 day history slurred speech and right sided deficits. History is limited due to patient's speech issues. He is able to state that is symptoms started 2 days prior. He has had limited appetite. He reports that he was unable to ambulate to the car. EMS was notified. In the ED, CT head without acute findings. He was not a candidate for tPA due to symptom onset. Hospitalist was consulted for further admission. Interval history / Subjective: Follow up CVA. Patient seen and examined. Still with persistent slurred speech, worse when patient attempts long sentences. Right sided weakness appears improved. MRI brain with 2 small areas of acute infarction in the left basal ganglia. CTA head/neck w/ severe stenosis posterior division M2 and mod-severe stenosis proximal P2 segment posterior cerebral artery on the right. Assessment & Plan:     Slurred speech and right sided weakness due to Acute CVA  -CT noncontrast head negative  -MRI brain with 2 small areas of acute infarction in the left basal ganglia  -CTA head/neck w/ severe stenosis posterior division M2 and mod-severe stenosis proximal P2 segment posterior cerebral artery on the right.  -Neurology consulted.  Will ask to review imaging for further recommendations  -monitor on telemetry  -neurovascular checks  -echocardiogram ordered  -speech evaluation   -PT/OT- suspect will need rehab  -.2- initiated on high intensity statin  -Ha1c pending  -continue aspirin      Elevated glucose: -a1c pending, empiric SSI     Acute renal insufficiency: improved  -s/p fluids            Code status: full   Prophylaxis: SCDs  Care Plan discussed with: patient, CM  Anticipated Disposition: 1-2 days, rehab pending further work up     Hospital Problems  Never Reviewed          Codes Class Noted POA    Slurred speech ICD-10-CM: R47.81  ICD-9-CM: 784.59  4/21/2022 Unknown                Review of Systems:   Negative unless stated above      Vital Signs:    Last 24hrs VS reviewed since prior progress note. Most recent are:  Visit Vitals  BP (!) 139/59 (BP 1 Location: Right arm, BP Patient Position: At rest)   Pulse (!) 54   Temp 98.9 °F (37.2 °C)   Resp 12   Ht 5' 8\" (1.727 m)   Wt 89.3 kg (196 lb 13.9 oz)   SpO2 98%   BMI 29.93 kg/m²       No intake or output data in the 24 hours ending 04/22/22 7870     Physical Examination:     I had a face to face encounter with this patient and independently examined them on 4/22/2022 as outlined below:          Constitutional:  No acute distress, cooperative, pleasant    ENT:  Oral mucosa moist, oropharynx benign. Resp:  CTA bilaterally. No wheezing/rhonchi/rales. No accessory muscle use. CV:  Regular rhythm, normal rate, no murmurs, gallops, rubs    GI:  Soft, non distended, non tender.  normoactive bowel sounds, no hepatosplenomegaly     Musculoskeletal:  No edema, warm, 2+ pulses throughout    Neurologic:  slurred speech, slight right facial droop, 4/5 upper and lower extremity strength            Data Review:    Review and/or order of clinical lab test  Review and/or order of tests in the radiology section of CPT  Review and/or order of tests in the medicine section of CPT      Labs:     Recent Labs     04/21/22  1451   WBC 6.1   HGB 13.2   HCT 43.8        Recent Labs     04/22/22  0649 04/21/22  1451    136   K 4.0 3.3*    106   CO2 25 25   BUN 18 18   CREA 1.02 1.36*   * 185*   CA 8.7 8.7     Recent Labs     04/21/22  1451   ALT 16   AP 92 TBILI 0.4   TP 7.5   ALB 3.3*   GLOB 4.2*     Recent Labs     04/21/22  1451   INR 1.0   PTP 10.6   APTT 26.1      No results for input(s): FE, TIBC, PSAT, FERR in the last 72 hours. No results found for: FOL, RBCF   No results for input(s): PH, PCO2, PO2 in the last 72 hours. No results for input(s): CPK, CKNDX, TROIQ in the last 72 hours.     No lab exists for component: CPKMB  Lab Results   Component Value Date/Time    Cholesterol, total 205 (H) 04/22/2022 06:49 AM    HDL Cholesterol 53 04/22/2022 06:49 AM    LDL, calculated 139.2 (H) 04/22/2022 06:49 AM    Triglyceride 64 04/22/2022 06:49 AM    CHOL/HDL Ratio 3.9 04/22/2022 06:49 AM     Lab Results   Component Value Date/Time    Glucose (POC) 138 (H) 04/22/2022 12:56 AM     No results found for: COLOR, APPRN, SPGRU, REFSG, DOMENICO, PROTU, GLUCU, KETU, BILU, UROU, CORBY, LEUKU, GLUKE, EPSU, BACTU, WBCU, RBCU, CASTS, UCRY      Medications Reviewed:     Current Facility-Administered Medications   Medication Dose Route Frequency    acetaminophen (TYLENOL) tablet 650 mg  650 mg Oral Q4H PRN    Or    acetaminophen (TYLENOL) solution 650 mg  650 mg Per NG tube Q4H PRN    Or    acetaminophen (TYLENOL) suppository 650 mg  650 mg Rectal Q4H PRN    aspirin chewable tablet 81 mg  81 mg Oral DAILY    atorvastatin (LIPITOR) tablet 40 mg  40 mg Oral QHS    polyethylene glycol (MIRALAX) packet 17 g  17 g Oral DAILY PRN    glucose chewable tablet 16 g  4 Tablet Oral PRN    dextrose 10 % infusion 0-250 mL  0-250 mL IntraVENous PRN    glucagon (GLUCAGEN) injection 1 mg  1 mg IntraMUSCular PRN    insulin lispro (HUMALOG) injection   SubCUTAneous AC&HS     ______________________________________________________________________  EXPECTED LENGTH OF STAY: - - -  ACTUAL LENGTH OF STAY:          [facility], DO

## 2022-04-22 NOTE — CONSULTS
[address]    Name:  Hunter Meza  MR#:  [id number]  :  1935  ACCOUNT #:  [de-identified]  DATE OF SERVICE:  2022      NEUROLOGY CONSULTATION    HISTORY OF PRESENT ILLNESS:  This is an 72-year-old left-handed male who was admitted yesterday with right-sided weakness and dysarthria. The patient reports onset of symptoms 2 days prior to presentation. Yesterday, he was trying to walk and he fell down and had difficulty getting up. After some period of time, he finally got up. He proceeded to go to the gym, and a friend called and noticed he did not sound like himself and the friend called EMS. EMS met him at the gym where they found him in the swimming pool swimming and brought him in for stroke evaluation. He denies any known stroke risk factors. He is not on any medications at home. 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 His CT of the head was unremarkable. CTA of the head and neck reveals severe right M2 stenosis and moderate-to-severe right P2 stenosis. MRI of the brain reveals a small left basal ganglia stroke and chronic ischemic white matter changes. EKG with sinus rhythm but first-degree AV block. LDL is 139.2. Hemoglobin A1c is 7.4. Echo is pending. He has been started on Lipitor 40 mg a day and aspirin 81 mg a day. He does not smoke. PAST MEDICAL HISTORY:  1. Right eye blindness. 2.  History of small bowel obstruction. REVIEW OF SYSTEMS:  Per past medical history or HPI, otherwise reviewed and negative. MEDICATIONS AT HOME:  None. Since admission:  1. Lipitor 40 mg day. 2.  Aspirin 81 mg a day. 3.  Humalog insulin. ALLERGIES:  NONE. SOCIAL HISTORY:  He lives alone in [address] [facility]. He is retired [employer] and then [employer]. No tobacco or drug use. Drinks alcohol maybe twice a month. FAMILY HISTORY:  No strokes in the family.   It was difficult to understand the patient to get remainder of his family history. PHYSICAL EXAMINATION:  VITAL SIGNS:  Blood pressure 133/76, pulse 56, respiratory rate 16, satting 98% on room air, temperature is 98.1. BMI of 29.8. GENERAL:  He is well-nourished, well-developed, healthy-appearing elderly male, sitting in bed, in no distress. HEART:  Regular rhythm, bradycardic. No murmur. C  CAROTIDS: 2+. No bruits. EXTREMITIES:  Warm. No edema. He has 2+ radial pulses. NEUROLOGIC:  Mental status, he is alert and oriented x4. Speech is severely dysarthric, but I think slightly improved from admission. Language is intact. Attention, memory and fund of knowledge are appropriate. Cranial nerve exam, he has no facial asymmetry or ptosis. His extraocular eye movements are intact. No nystagmus seen. Pupils are round and reactive. Tongue midline. Palate elevated symmetrically. Sensation and hearing intact. Trapezius and sternocleidomastoid are 5/5. Motor exam, he has 5/5 strength in the left arm and leg. On the right, he has very mild weakness with  5-/5, otherwise good strength throughout. No pronator drift. No tremors. Sensory exam, he reported decreased to pinprick in the left arm and leg. His coordination was intact on the left to finger-to-nose and rapid alternating movements and slow on the right. Heel-to-shin was intact bilaterally. Reflexes were diminished throughout. Toes are downgoing. Gait not assessed at this time. STUDIES AND REPORTS:  Reviewed above. ASSESSMENT AND PLAN:  This is an 80-year left-handed male with no known stroke risk factors with a 2-day history of severe dysarthria and right-sided weakness with stroke confirmed on MRI, small infarct in left basal ganglia consistent with small vessel infarct. He has been found to have diabetes with a hemoglobin A1c of 7.4, hypercholesterolemia with an LDL of 140 and possibly hypertension with blood pressures in the 130s and 140s.   Agree with aspirin as started and Lipitor 40 mg daily. Will defer management of diabetes to the hospitalist.  Follow up with PCP after discharge to maximize management of his hypertension. The patient would likely benefit from physical therapy, occupational therapy, speech therapy and possibly rehab especially given that he lives alone. Echocardiogram is pending. Swallowing evaluation pending. Will have Neurology follow up this result, otherwise. Please call, if needed.       Wil Dumas MD      [id number]  D:  04/22/2022 12:49  T:  04/22/2022 13:21  JOB #:  [id number]

## 2022-04-22 NOTE — CONSULTS
Neuro consult completed, dictated note to follow. Left BG small vessel infarct. Pt without known risk factors on no medications at home. Exam with dysarthria, right hand 5-/5 weakness, no PD, slow FTN and ORAL on right, diminished reflexes throughout, toes downgoing. .2 HgbA1C is pending. Continue ASA 81mg and Lipitor 40mg as started. Echo pending. PT/OT/ST/Rehab.

## 2022-04-22 NOTE — PROGRESS NOTES
Transition of Care Plan: IPR ([facility] pending)  -Independent, lives alone  - patient. Clinicals faxed to [facility] transfer center    RUR: 7% low    [facility]    Disposition: IPR    Transportation: BLS    Main Contact: POA (per patient)-Juni RAMOSXXGLPFCS-[phone]  Friend-Jewell NietoFnxe-[phone]: Met with patient at the bedside. A&O x 3. However, patient has slurred speech and unable to understand at times. Patient states he lives alone at [address] [place] [address]. Fate, [facility]. [address] address on file. States that his healthcare decision maker and POA is Loulou Bryant. States that he only has  for insurance. Discussed that he may need to be transferred to the [facility] in order for them to cover his hospital stay, but that the [facility] will determine if he needs to stay at [facility] for care. Agreeable to transfer. [facility] fax[facility] to [facility] at [phone]. Discussed that PT/OT will see him and may recommend some type of rehab. Patient states that he refuses to go to a nursing home and plans to return home once rehab is over. Will continue to follow for medical/therapy recommendations    1047: Confirmed with Hi Hearn at the [facility] ([phone]) that forms were received and filled out correctly. States that they will review forms and get back in touch with this CM    1459: Therapy recommending IPR. Patient states his first choice is [facility]. Second choice if unable to accept would be SHELLEY Bahena RN, CRM    Residency:  lives in a one story home. No stairs  Lives With: Alone  Prior functioning:  independent   Prior DME used: none  Rehab history: Home Health history:  At Home  Pharmacy: [address]    Reason for Admission:  Slurred speech                   RUR Score: 7% low                  Plan for utilizing home health:  TBD-pending therapy recommendations        PCP: First and Last name:  Other, MD Stevie     Name of Practice: [address] [place] [address]   Are you a current patient: Yes/No:    Approximate date of last visit: 6 months ago   Can you participate in a virtual visit with your PCP:                     Current Advanced Directive/Advance Care Plan: Full Code      Healthcare Decision Maker:   Click here to complete [address] including selection of the Healthcare Decision Maker Relationship (ie \"Primary\")  POA (per patient)-Juni GonzalesRhhcouvl-[phone]                  Transition of Care Plan:  TBD-pending medical/therapy recommendations                  Care Management Interventions  PCP Verified by CM: Yes ([facility] Doctor)  Mode of Transport at Discharge:  Other (see comment) (TBD-friend vs BLS)  Physical Therapy Consult: Yes  Occupational Therapy Consult: Yes  Speech Therapy Consult: Yes  Support Systems: Friend/Neighbor  Confirm Follow Up Transport: Friends (TBD-vs BLS)  Discharge Location  Patient Expects to be Discharged to[de-identified] Other: (TBD-home vs IPR)

## 2022-04-22 NOTE — PROGRESS NOTES
Problem: Mobility Impaired (Adult and Pediatric)  Goal: *Acute Goals and Plan of Care (Insert Text)  Description: FUNCTIONAL STATUS PRIOR TO ADMISSION: Pt reports indep, active baseline. Drives, goes to gym where he enjoys the hot tub and steam room. 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 HOME SUPPORT PRIOR TO ADMISSION: Pt lives alone    Physical Therapy Goals  Initiated 4/22/2022  1. Patient will move from supine to sit and sit to supine  in bed with supervision/set-up within 7 day(s). 2.  Patient will transfer from bed to chair and chair to bed with contact guard assist using the least restrictive device within 7 day(s). 3.  Patient will perform sit to stand with contact guard assist within 7 day(s). 4.  Patient will ambulate with minimal assistance/contact guard assist for at least 50 feet with the least restrictive device within 7 day(s). 5.  Patient will ascend/descend 3 stairs with bilat handrail(s) with minimal assistance/contact guard assist within 7 day(s). Outcome: Not Met   PHYSICAL THERAPY EVALUATION  Patient: Phillip Jameson. Angie Guo (80 y.o. male)  Date: 4/22/2022  Primary Diagnosis: Slurred speech [R47.81]        Precautions: fall , blind R eye      ASSESSMENT  Based on the data described below, the patient presents with dysarthria, mild strength/ coordination deficits R side, impaired balance, decreased activity tolerance, gait dysfunction, decreased indep with functional mobility as compared to baseline s/p admit with slurred speech, R weakness, fall. MRI positive for small acute infarct L BG. Pt received with friend at bedside. Oriented x 4, dysarthric, cooperative. Pt mobilized with min A overall for bed mob, transfers, short distance amb with HHA and use of intermittent external support for balance. Sat in chair briefly, then requested return to bed due to fatigue. Vitals stable throughout session. Pt remains below functional baseline.  Will benefit from con't PT for balance/ coordination training and functional mobility training with use of assistive device as appropriate. Pt demo strong motivation to return to PLOF and good rehab potential. Recommend follow up IPR at d/c. Current Level of Function Impacting Discharge (mobility/balance): bed mob min A, sit to stand/ stand pivot min A, short distance amb min HHA and intermittent external support     Functional Outcome Measure: The patient scored 5/56 on the Guido balance outcome measure which is indicative of high risk for fall. Other factors to consider for discharge: unsafe to return home alone     Patient will benefit from skilled therapy intervention to address the above noted impairments. PLAN :  Recommendations and Planned Interventions: bed mobility training, transfer training, gait training, therapeutic exercises, neuromuscular re-education, patient and family training/education, and therapeutic activities      Frequency/Duration: Patient will be followed by physical therapy:  5 times a week to address goals. Recommendation for discharge: (in order for the patient to meet his/her long term goals)  Therapy 3 hours per day 5-7 days per week    This discharge recommendation:  Has not yet been discussed the attending provider and/or case management    IF patient discharges home will need the following DME: to be determined (TBD)         SUBJECTIVE:   Patient talkative, but dysarthric    OBJECTIVE DATA SUMMARY:   HISTORY:    Past Medical History:   Diagnosis Date    Blindness of right eye     Small bowel obstruction ([address]    No past surgical history on file.     Personal factors and/or comorbidities impacting plan of care: h/o blindness R eye    Home Situation  Home Environment: Private residence  # Steps to Enter: 3  Rails to Enter: Yes  Hand Rails : Bilateral  One/Two Story Residence: One story  Living Alone: Yes  Support Systems: Friend/Neighbor  Patient Expects to be Discharged to[de-identified] Other: (TBD-home vs IPR)  Current DME Used/Available at Home: Cane, straight  Tub or Shower Type: Tub/Shower combination    EXAMINATION/PRESENTATION/DECISION MAKING:   Critical Behavior:  Neurologic State: Alert  Orientation Level: Oriented to person,Oriented to place,Oriented to time  Cognition: Follows commands  Safety/Judgement: Awareness of environment  Range Of Motion:  AROM: Generally decreased, functional                       Strength:    Strength: Generally decreased, functional (mild weakness R>L  )                    Tone & Sensation:   Tone: Normal              Sensation: Intact (light touch intact)               Coordination:  Coordination: Generally decreased, functional (mildly impaired R v L with heel to shin)  Vision:      Functional Mobility:  Bed Mobility:     Supine to Sit: Minimum assistance  Sit to Supine: Contact guard assistance  Scooting: Contact guard assistance; Additional time (for scoot to EOB)  Transfers:  Sit to Stand: Minimum assistance  Stand to Sit: Minimum assistance        Bed to Chair: Minimum assistance (stand pivot)              Balance:   Sitting: Impaired  Sitting - Static: Good (unsupported)  Sitting - Dynamic: Fair (occasional)  Standing: Impaired; With support  Standing - Static: Fair;Constant support  Standing - Dynamic : Fair;Constant support  Ambulation/Gait Training:  Distance (ft): 10 Feet (ft)  Assistive Device: Gait belt (HHA, reached for external support with opposite hand)  Ambulation - Level of Assistance: Minimal assistance        Gait Abnormalities: Decreased step clearance;Trunk sway increased; Shuffling gait              Speed/Shania: Pace decreased (<100 feet/min)  Step Length: Left shortened;Right shortened       Functional Measure:  Guido Balance Test:    Sitting to Standin  Standing Unsupported: 0  Sitting with Back Unsupported: 3  Standing to Sittin  Transfers: 1  Standing Unsupported with Eyes Closed: 0  Standing Unsupported with Feet Together: 0  Reach Forward with Outstretched Arm: 0   Object: 0  Turn to Look Over Shoulders: 0  Turn 360 Degrees: 0  Alternate Foot on Step/Stool: 0  Standing Unsupported One Foot in Front: 0  Stand on One Le  Total: 5/         56=Maximum possible score;   0-20=High fall risk  21-40=Moderate fall risk   41-56=Low fall risk               Physical Therapy Evaluation Charge Determination   History Examination Presentation Decision-Making   MEDIUM  Complexity : 1-2 comorbidities / personal factors will impact the outcome/ POC  MEDIUM Complexity : 3 Standardized tests and measures addressing body structure, function, activity limitation and / or participation in recreation  LOW Complexity : Stable, uncomplicated  LOW Complexity : FOTO score of       Based on the above components, the patient evaluation is determined to be of the following complexity level: LOW     Pain Rating:  No c/o pain    Activity Tolerance:   Good    After treatment patient left in no apparent distress:   Supine in bed, Call bell within reach, Bed / chair alarm activated, and Side rails x 3    COMMUNICATION/EDUCATION:   The patients plan of care was discussed with: Registered nurse. Fall prevention education was provided and the patient/caregiver indicated understanding., Patient/family have participated as able in goal setting and plan of care. , and Patient/family agree to work toward stated goals and plan of care.     Thank you for this referral.  Claude Hobby, PT   Time Calculation: 44 mins

## 2022-04-22 NOTE — PROGRESS NOTES
Physician Progress Note      PATIENT:               Yanni Trivedi  CSN #:                  [id number]  :                       1935  ADMIT DATE:       2022 2:13 PM  DISCH DATE:  RESPONDING  PROVIDER #:        AVA Craig DO          QUERY TEXT:    Patient admitted with CVA. Pt noted to have elevated glucose and A1C of 7.4. Please document in progress notes and discharge summary the type of DM: The medical record reflects the following:  Risk Factors: 87yo    Clinical Indicators:  A1c: 7.4    Glucose: 185 -- 130    Treatment: empiric SSI, insulin, A1C    Thank you,  [facility]  [phone]  Options provided:  -- DM type II  -- Other - I will add my own diagnosis  -- Disagree - Not applicable / Not valid  -- Disagree - Clinically unable to determine / Unknown  -- Refer to Clinical Documentation Reviewer    PROVIDER RESPONSE TEXT:    This patient has DM type II. Query created by: Nikolai Mckeon on 2022 5:21 PM      QUERY TEXT:    Pt admitted with CVA. Pt noted to have SCr of 1.36 and 1.02. If possible, please document in the progress notes and discharge summary if you are evaluating and/or treating any of the following:     The medical record reflects the following:  Risk Factors: 87yo    Clinical Indicators:  Creatinine: 1.36 -- 1.02  GFR: >60    Treatment: IV fluids, daily labs    Defined by Kidney Disease Improving Global Outcomes (KDIGO) clinical practice guideline for acute kidney injury:  -Increase in SCr by greater than or equal to 0.3 mg/dl within 48?hours; or  -Increase or decrease in SCr to greater than or equal to 1.5 times baseline, which is known or presumed to have occurred within the prior 7 days; or  -Urine volume < 0.5ml/kg/h for 6 hours    Thank you,  [facility]  [phone]  Options provided:  -- Acute kidney injury  -- Acute kidney insufficiency  -- Other - I will add my own diagnosis  -- Disagree - Not applicable / Not valid  -- Disagree - Clinically unable to determine / Unknown  -- Refer to Clinical Documentation Reviewer    PROVIDER RESPONSE TEXT:    This patient has acute kidney insufficiency.     Query created by: Robert Briones on 4/22/2022 5:21 PM      Electronically signed by:  Ana Waller DO 4/22/2022 5:28 PM

## 2022-04-22 NOTE — PROGRESS NOTES
Problem: Dysphagia (Adult)  Goal: *Acute Goals and Plan of Care (Insert Text)  4/22/2022 1237 by LORA Charlton  Outcome: Progressing Towards Goal  4/22/2022 1234 by LORA Charlton  Note: Speech Therapy Goals  Initiated 4/22/2022  1. Patient will tolerate regular diet with thin liquids without signs/symptoms of aspiration or verbal cues to clear pocketingv given no cues within 7day(s). Problem: Communication Impaired (Adult)  Goal: *Acute Goals and Plan of Care (Insert Text)  4/22/2022 1237 by LORA Charlton  Outcome: Progressing Towards Goal  4/22/2022 1235 by LORA Charlton  Note: Speech Therapy Goals  Initiated 4/22/2022  WITHIN 7 DAYS, PATIENT WILL:    1. Learn and demo 2 dysarthria strategies   2. Use in structured tasks to increase intelligibility to 85% in sentences      [address]  Patient: Ramsey Ceja (80 y.o. male)  Date: 4/22/2022  Primary Diagnosis: Slurred speech [R47.81]        Precautions:        ASSESSMENT :  Based on the objective data described below, the patient presents with dysarthria and oral dysphagia following CVA. He is alert and interactive, aware of dysarthria, no word finding deficits. He has low awareness of pocketing when present but did clear with verbal cues only by end of meal. Intelligibility was variable and patient with most difficulty with consonant blends, intelligibility increases with cues. Patient will benefit from skilled intervention to address the above impairments.   Patients rehabilitation potential is considered to be Good     PLAN :  Recommendations and Planned Interventions:  --Chnge diet to soft and bite sized: he was pocketing large chunks which has not been chewed so this is best for now  --Will need verbal cues for pocketing until learns to check independently  --Thin liquids  --Dysarthria tx  Frequency/Duration: Patient will be followed by speech-language pathology 3 times a week to address goals. Discharge Recommendations: Inpatient Rehab, HH,  or Outpatient, dependent on other physical needs     SUBJECTIVE:   Patient stated [place] called the rescue squad. OBJECTIVE:     Past Medical History:   Diagnosis Date    Blindness of right eye     Small bowel obstruction ([address]    No past surgical history on file. Prior Level of Function/Home Situation:   Home Situation  Support Systems: Friend/Neighbor  Patient Expects to be Discharged to[de-identified] Other: (TBD-home vs IPR)  Diet prior to admission: unrestricted per patient  Current Diet:  Regular    Cognitive and Communication Status:  Neurologic State: Alert  Orientation Level: Oriented to situation  Cognition: Follows commands     Perseveration: No perseveration noted  Safety/Judgement: Awareness of environment  Swallowing Evaluation:   Oral Assessment:  Oral Assessment  Labial: Right droop  Dentition:  (has dentures but does not wear for PO)  Oral Hygiene: clean tongue  Lingual:  (suspected decreased sensation based on pocketing)  P.O. Trials:  Patient Position: upright in bed, positioned more upright by SLP when I entered  Vocal quality prior to P.O.: No impairment;Strain  Consistency Presented: Thin liquid; Solid;Puree;Mechanical soft  How Presented: Self-fed/presented;Cup/sip;Cup/gulp  How Much:  (full meal)  Bolus Acceptance: No impairment  Bolus Formation/Control: Impaired  Type of Impairment: Mastication (pocketed items were not well masticated)     Oral Residue: 10-50% of bolus (R side, posterior ckeek)     Laryngeal Elevation: Functional  Aspiration Signs/Symptoms: None  Pharyngeal Phase Characteristics: No impairment, issues, or problems                      NOMS:   The NOMS functional outcome measure was used to quantify this patient's level of swallowing impairment.   Based on the NOMS, the patient was determined to be at level 5 for swallow function       NOMS Swallowing Levels:  Level 1 (CN): NPO  Level 2 (CM): NPO but takes consistency in therapy  Level 3 (CL): Takes less than 50% of nutrition p.o. and continues with nonoral feedings; and/or safe with mod cues; and/or max diet restriction  Level 4 (CK): Safe swallow but needs mod cues; and/or mod diet restriction; and/or still requires some nonoral feeding/supplements  Level 5 (CJ): Safe swallow with min diet restriction; and/or needs min cues  Level 6 (CI): Independent with p.o.; rare cues; usually self cues; may need to avoid some foods or needs extra time  Level 7 ([address] [place] [address]): Independent for all p.o.  MARTHA. (2003). National Outcomes Measurement System (NOMS): Adult Speech-Language Pathology User's Guide. Speech/Language Evaluation  Motor Speech:  Oral-Motor Structure/Motor Speech  Labial: Right droop  Dentition:  (has dentures but does not wear for PO)  Oral Hygiene: clean tongue  Lingual:  (suspected decreased sensation based on pocketing)  Apraxic Characteristics: None  Dysarthric Characteristics: Blended word boundaries; Imprecise  Intelligibility: Impaired  Word Intelligibility (%): 90 % (90)  Sentence Intelligibility (%): 75 %  Conversation Intelligibility (%):  (75)  Rate: WNL, but paitnet needs to compensate further by slowing rate  Overall Impairment Severity: Mild-moderate  Compensatory Strategies for Motor Speech: educated patient on strategies of slow rate, overarticulation  Language Comprehension and Expression:  Auditory Comprehension  Auditory Impairment: No   Verbal Expression  Primary Mode of Expression: Verbal  Initiation: No impairment  Automatic Speech Task: No impairment  Repetition: No impairment  Sentence Formulation: No impairment  Conversation: No impairment;Dysarthric        Neuro-Linguistics:                                                  Pragmatics:        Voice:                 Vocal Quality: No impairment;Strain                                 NOMS: motor speech 5    Pain:   no c/o pain          After treatment:   Patient left in no apparent distress in bed, Caregiver / family present and Safety precautions posted at beside. COMMUNICATION/EDUCATION:   Patient was educated regarding his deficit(s) of dysarthria as this relates to his diagnosis of CVA. He demonstrated Good understanding as evidenced by his responses. The patient's plan of care including recommendations, planned interventions, and recommended diet changes were discussed with: Registered nurse. Patient/family have participated as able in goal setting and plan of care.     Thank you for this referral.  LORA Casarez  Time Calculation: 26 mins

## 2022-04-23 PROBLEM — I67.2 INTRACRANIAL ATHEROSCLEROSIS: Status: ACTIVE | Noted: 2022-04-23

## 2022-04-23 PROBLEM — I63.312 CEREBROVASCULAR ACCIDENT (CVA) DUE TO THROMBOSIS OF LEFT MIDDLE CEREBRAL ARTERY (HCC): Status: ACTIVE | Noted: 2022-04-23

## 2022-04-23 LAB
ATRIAL RATE: 62 BPM
CALCULATED P AXIS, ECG09: 78 DEGREES
CALCULATED R AXIS, ECG10: -5 DEGREES
CALCULATED T AXIS, ECG11: -46 DEGREES
DIAGNOSIS, 93000: NORMAL
GLUCOSE BLD STRIP.AUTO-MCNC: 101 MG/DL (ref 65–117)
GLUCOSE BLD STRIP.AUTO-MCNC: 133 MG/DL (ref 65–117)
GLUCOSE BLD STRIP.AUTO-MCNC: 142 MG/DL (ref 65–117)
GLUCOSE BLD STRIP.AUTO-MCNC: 150 MG/DL (ref 65–117)
P-R INTERVAL, ECG05: 356 MS
Q-T INTERVAL, ECG07: 390 MS
QRS DURATION, ECG06: 82 MS
QTC CALCULATION (BEZET), ECG08: 395 MS
SERVICE CMNT-IMP: ABNORMAL
SERVICE CMNT-IMP: NORMAL
VENTRICULAR RATE, ECG03: 62 BPM

## 2022-04-23 PROCEDURE — 74011636637 HC RX REV CODE- 636/637: Performed by: INTERNAL MEDICINE

## 2022-04-23 PROCEDURE — 74011250637 HC RX REV CODE- 250/637: Performed by: PSYCHIATRY & NEUROLOGY

## 2022-04-23 PROCEDURE — 82962 GLUCOSE BLOOD TEST: CPT

## 2022-04-23 PROCEDURE — 74011250637 HC RX REV CODE- 250/637: Performed by: INTERNAL MEDICINE

## 2022-04-23 PROCEDURE — 99233 SBSQ HOSP IP/OBS HIGH 50: CPT | Performed by: PSYCHIATRY & NEUROLOGY

## 2022-04-23 PROCEDURE — 65270000046 HC RM TELEMETRY

## 2022-04-23 RX ORDER — CLOPIDOGREL BISULFATE 75 MG/1
75 TABLET ORAL DAILY
Status: DISCONTINUED | OUTPATIENT
Start: 2022-04-24 | End: 2022-04-25 | Stop reason: HOSPADM

## 2022-04-23 RX ORDER — CLOPIDOGREL BISULFATE 75 MG/1
300 TABLET ORAL ONCE
Status: COMPLETED | OUTPATIENT
Start: 2022-04-23 | End: 2022-04-23

## 2022-04-23 RX ADMIN — Medication 2 UNITS: at 17:51

## 2022-04-23 RX ADMIN — ASPIRIN 81 MG CHEWABLE TABLET 81 MG: 81 TABLET CHEWABLE at 09:13

## 2022-04-23 RX ADMIN — ATORVASTATIN CALCIUM 40 MG: 40 TABLET, FILM COATED ORAL at 21:28

## 2022-04-23 RX ADMIN — Medication 2 UNITS: at 12:35

## 2022-04-23 RX ADMIN — CLOPIDOGREL BISULFATE 300 MG: 75 TABLET ORAL at 16:28

## 2022-04-23 NOTE — PROGRESS NOTES
LIZZETTE Nevarez. Abelardo Hameed is a 80 y.o. male  with no known stroke risk factors with a 2-day history of severe dysarthria and right-sided weakness with stroke confirmed on MRI. Small infarct in left basal ganglia consistent with small vessel infarct. He has been found to have diabetes with a hemoglobin A1c of 7.4, hypercholesterolemia with an LDL of 140 and possibly hypertension with blood pressures in the 130s and 140 days. Started on aspirin and statin    Still complains of weakness in the right upper and lower extremity, minor slurring of speech and right facial weakness. EXAM  Exam:  Visit Vitals  /70 (BP 1 Location: Right upper arm, BP Patient Position: At rest)   Pulse (!) 58   Temp 98.3 °F (36.8 °C)   Resp 15   Ht 5' 8\" (1.727 m)   Wt 196 lb (88.9 kg)   SpO2 99%   BMI 29.80 kg/m²     Gen:  CV: RRR  Lungs: non labored breathing  Abd: non distending  Neuro: A&O x 3, minimal dysarthria   CN II-XII: PERRL, EOMI, face symmetric, tongue/palate midline  Motor: Trace weakness of the  on the right hand when compared to the left. 4+/5 strength in the right lower extremity and 5/5 on the left   Sensory: intact to LT  Gait: Deferred    LABS/ IMAGING  MRI Results (most recent):  Results from Hospital Encounter encounter on 04/21/22    MRI BRAIN W WO CONT    Narrative  Clinical history: slurred speech, right sided weakness  INDICATION:   slurred speech, right sided weakness    COMPARISON: CTA 4/21/2022  TECHNIQUE: MR examination of the brain includes axial and sagittal T1 , axial  T2, axial FLAIR, axial gradient echo, axial DWI, coronal T1 . Pre and post  contrast axial T1-weighted imaging. Postcontrast T1-weighted imaging coronal  plane. CONTRAST: 20 ml ProHance  FINDINGS:  Small focus of acute infarction in the left basal ganglia. There are confluent  periventricular and scattered foci of increased T2 signal intensity demonstrated  in the corona radiata, centrum semiovale and central oc. Ethmoid and mild  frontal sinus disease is demonstrated. There is maxillary sinus disease that is  mild. There is no abnormal parenchymal enhancement. There is no abnormal meningeal  enhancement demonstrated. The brain architecture is normal. There is no evidence  of midline shift or mass-effect. The ventricles are normal in size, position and  configuration. There are no extra-axial fluid collections. Major intracranial  vascular flow-voids are unremarkable. The orbits are grossly symmetric. Dural  venous sinuses are grossly unremarkable. There is no Chiari or syrinx. Pituitary  and infundibulum grossly unremarkable. Cerebellopontine angles are unremarkable. No skull base mass is identified. Cavernous sinuses are symmetric. The mastoid  air cells and are well pneumatized and clear. Impression  2 small foci of acute infarction in the left basal ganglia. Mild to moderate chronic microvascular ischemic change and moderate temporal  predominant cerebral atrophy. Mild diffuse paranasal sinus disease. No intracranial mass, hemorrhage. CT Results (most recent):  Results from Hospital Encounter encounter on 04/21/22    CTA HEAD NECK W CONT    Narrative  EXAM: CTA HEAD NECK W CONT  Clinical history: Acute CVA  INDICATION: acute cva    COMPARISON: 4/21/2020. CONTRAST: 100 mL of Isovue-370. TECHNIQUE:  Unenhanced  images were obtained to localize the volume for  acquisition. Multislice helical axial CT angiography was performed from the  aortic arch to the top of the head during uneventful rapid bolus intravenous  contrast administration. Coronal and sagittal reformations and 3D post  processing was performed. CT dose reduction was achieved through use of a  standardized protocol tailored for this examination and automatic exposure  control for dose modulation. This study was analyzed by the [address] ai algorithm. FINDINGS:    CTA NECK  No mass or nodule. Wolf Miyamoto No pneumothorax.  Common origin of the innominate and left  common carotid arteries. Apical pleural thickening and scar. The left vertebral  artery slightly larger than the right vertebral artery. Mild atherosclerotic  change at the origin of the right ICA and left ICA. Cuco Case % of right carotid artery stenosis: Less than 30  % of left carotid artery stenosis: Less than 50    NASCET method was utilized for calculating stenosis. Left vertebral artery is dominant. .  The cervical soft tissues are unremarkable. There are degenerative changes of the cervical spine. CTA HEAD  Dominant left vertebral artery. Mild stenosis of the V4 segments of the right  and left vertebral artery. Petrous and cavernous ICAs are patent. M1 segments  are patent. There are A1 segments bilaterally. A2 and A3 segments are  unremarkable. . The basilar artery and its branches are normal. The internal  carotid, anterior cerebral, and middle cerebral arteries are patent. Moderate to  severe stenosis of the P2 segment proximally on the right. Moderate to severe  stenoses proximal M2 segment on the right. 3-860 through 867. Posterior  division. . There is no aneurysm. There are no sizable posterior communicating  arteries. Mild chronic microvascular ischemic change. Impression  There is no major vessel occlusion. Severe stenosis posterior division M2 segment on the right. Moderate to severe stenosis proximal P2 segment of the posterior cerebral artery  on the right. Mild cervical ICA stenosis. Lab Results   Component Value Date/Time    Cholesterol, total 205 (H) 04/22/2022 06:49 AM    HDL Cholesterol 53 04/22/2022 06:49 AM    LDL, calculated 139.2 (H) 04/22/2022 06:49 AM    VLDL, calculated 12.8 04/22/2022 06:49 AM    Triglyceride 64 04/22/2022 06:49 AM    CHOL/HDL Ratio 3.9 04/22/2022 06:49 AM     Echocardiogram with normal EF. Moderately dilated left atrium.     Lab Results   Component Value Date/Time    Hemoglobin A1c 7.4 (H) 04/22/2022 06:49 AM       [facility] Problems  Never Reviewed          Codes Class Noted POA    Cerebrovascular accident (CVA) due to thrombosis of left middle cerebral artery [facility]) ICD-10-CM: P69.037  ICD-9-CM: 434.01  4/23/2022 Unknown        Intracranial atherosclerosis ICD-10-CM: I67.2  ICD-9-CM: 437.0  4/23/2022 Unknown               PLAN  Elderly, 28-year-old male with newly found diabetes, borderline hypertension, dyslipidemia who is admitted with small infarctions in the left basal ganglia likely due to small vessel atherosclerosis.   CTA shows moderate stenosis of the right MCA M2 branch and right PCA, there is about 50% stenosis in the left ICA proximally  Recommend dual antiplatelet therapy for 21 days followed by aspirin and statin  PT/OT/ST evaluation  Initiate discharge planning    Lizette Blanton MD

## 2022-04-23 NOTE — ROUTINE PROCESS
Bedside and Verbal shift change report given to Hayley Gudino RN (oncoming nurse) by Ashley Muniz RN and Clementine Garcia RN (offgoing nurse). Report included the following information SBAR, Kardex, Intake/Output, MAR, Recent Results and Cardiac Rhythm Normal sinus rhythm.

## 2022-04-23 NOTE — PROGRESS NOTES
[facility]                                                                                          Hospitalist Progress Note  [address] [facility],   Answering service: [phone] from in house phone        Date of Service:  2022  NAME:  Mollie Duverney. Chong Mose  :  1935  MRN:  [id number]      Admission Summary:   Mollie Duverney. Chong Mose with past medical history SBO, right eye blindness, presenting to [facility] with 2 day history slurred speech and right sided deficits. History is limited due to patient's speech issues. He is able to state that is symptoms started 2 days prior. He has had limited appetite. He reports that he was unable to ambulate to the car. EMS was notified. In the ED, CT head without acute findings. He was not a candidate for tPA due to symptom onset. Hospitalist was consulted for further admission. Interval history / Subjective: Follow up CVA. Patient seen and examined. Appears improved. Assessment & Plan:     Dysarthria and right sided weakness due to Acute CVA  -CT noncontrast head negative  -MRI brain with 2 small areas of acute infarction in the left basal ganglia  -CTA head/neck w/ severe stenosis posterior division M2 and mod-severe stenosis proximal P2 segment posterior cerebral artery on the right.  -Neurology consulted.  Will ask to review imaging for further recommendations  -monitor on telemetry  -neurovascular checks  -echocardiogram with EF 55-60%   -speech evaluation - dysphagia diet   -PT/OT- suspect will need rehab  -.2- initiated on high intensity statin  -Ha1c 7.4  -continue aspirin, plavix x 21 days, then asa daily      Diabetes mellitus, Type II  -continue SSI while inpatient  -transition to metformin on discharge     Acute renal insufficiency: improved  -s/p fluids            Code status: full   Prophylaxis: SCDs  Care Plan discussed with: patient, nurse  Anticipated Disposition: 1-2 days, IPR Hospital Problems  Never Reviewed          Codes Class Noted POA    Cerebrovascular accident (CVA) due to thrombosis of left middle cerebral artery [facility]) ICD-10-CM: B21.918  ICD-9-CM: 434.01  4/23/2022 Unknown        Intracranial atherosclerosis ICD-10-CM: N37.2  ICD-9-CM: 437.0  4/23/2022 Unknown                Review of Systems:   Negative unless stated above      Vital Signs:    Last 24hrs VS reviewed since prior progress note. Most recent are:  Visit Vitals  BP (!) 107/56 (BP 1 Location: Right upper arm, BP Patient Position: At rest)   Pulse (!) 56   Temp 98.6 °F (37 °C)   Resp 19   Ht 5' 8\" (1.727 m)   Wt 88.9 kg (196 lb)   SpO2 98%   BMI 29.80 kg/m²         Intake/Output Summary (Last 24 hours) at 4/23/2022 1829  Last data filed at 4/23/2022 1200  Gross per 24 hour   Intake 400 ml   Output    Net 400 ml        Physical Examination:     I had a face to face encounter with this patient and independently examined them on 4/23/2022 as outlined below:          Constitutional:  No acute distress, cooperative, pleasant    ENT:  Oral mucosa moist, oropharynx benign. Resp:  CTA bilaterally. No wheezing/rhonchi/rales. No accessory muscle use. CV:  Regular rhythm, normal rate, no murmurs, gallops, rubs    GI:  Soft, non distended, non tender.  normoactive bowel sounds, no hepatosplenomegaly     Musculoskeletal:  No edema, warm, 2+ pulses throughout    Neurologic:  dysarthria, slight right facial droop, 4/5 upper extremity strength             Data Review:    Review and/or order of clinical lab test  Review and/or order of tests in the radiology section of CPT  Review and/or order of tests in the medicine section of CPT      Labs:     Recent Labs     04/21/22  1451   WBC 6.1   HGB 13.2   HCT 43.8        Recent Labs     04/22/22  0649 04/21/22  1451    136   K 4.0 3.3*    106   CO2 25 25   BUN 18 18   CREA 1.02 1.36*   * 185*   CA 8.7 8.7     Recent Labs     04/21/22  1451   ALT 16   AP 92   TBILI 0.4   TP 7.5   ALB 3.3*   GLOB 4.2*     Recent Labs     04/21/22  1451   INR 1.0   PTP 10.6   APTT 26.1      No results for input(s): FE, TIBC, PSAT, FERR in the last 72 hours. No results found for: FOL, RBCF   No results for input(s): PH, PCO2, PO2 in the last 72 hours. No results for input(s): CPK, CKNDX, TROIQ in the last 72 hours.     No lab exists for component: CPKMB  Lab Results   Component Value Date/Time    Cholesterol, total 205 (H) 04/22/2022 06:49 AM    HDL Cholesterol 53 04/22/2022 06:49 AM    LDL, calculated 139.2 (H) 04/22/2022 06:49 AM    Triglyceride 64 04/22/2022 06:49 AM    CHOL/HDL Ratio 3.9 04/22/2022 06:49 AM     Lab Results   Component Value Date/Time    Glucose (POC) 142 (H) 04/23/2022 05:38 PM    Glucose (POC) 150 (H) 04/23/2022 12:02 PM    Glucose (POC) 133 (H) 04/23/2022 06:45 AM    Glucose (POC) 149 (H) 04/22/2022 11:10 PM    Glucose (POC) 131 (H) 04/22/2022 04:50 PM     No results found for: COLOR, APPRN, SPGRU, REFSG, DOMENICO, PROTU, GLUCU, KETU, BILU, UROU, CORBY, LEUKU, GLUKE, EPSU, BACTU, WBCU, RBCU, CASTS, UCRY      Medications Reviewed:     Current Facility-Administered Medications   Medication Dose Route Frequency    [START ON 4/24/2022] clopidogreL (PLAVIX) tablet 75 mg  75 mg Oral DAILY    acetaminophen (TYLENOL) tablet 650 mg  650 mg Oral Q4H PRN    Or    acetaminophen (TYLENOL) solution 650 mg  650 mg Per NG tube Q4H PRN    Or    acetaminophen (TYLENOL) suppository 650 mg  650 mg Rectal Q4H PRN    aspirin chewable tablet 81 mg  81 mg Oral DAILY    atorvastatin (LIPITOR) tablet 40 mg  40 mg Oral QHS    polyethylene glycol (MIRALAX) packet 17 g  17 g Oral DAILY PRN    glucose chewable tablet 16 g  4 Tablet Oral PRN    dextrose 10 % infusion 0-250 mL  0-250 mL IntraVENous PRN    glucagon (GLUCAGEN) injection 1 mg  1 mg IntraMUSCular PRN    insulin lispro (HUMALOG) injection   SubCUTAneous AC&HS ______________________________________________________________________  EXPECTED LENGTH OF STAY: 2d 0h  ACTUAL LENGTH OF STAY:          2                 Zenobia Bhavesh Sorensen, DO

## 2022-04-24 LAB
GLUCOSE BLD STRIP.AUTO-MCNC: 117 MG/DL (ref 65–117)
GLUCOSE BLD STRIP.AUTO-MCNC: 140 MG/DL (ref 65–117)
GLUCOSE BLD STRIP.AUTO-MCNC: 146 MG/DL (ref 65–117)
GLUCOSE BLD STRIP.AUTO-MCNC: 157 MG/DL (ref 65–117)
SERVICE CMNT-IMP: ABNORMAL
SERVICE CMNT-IMP: NORMAL

## 2022-04-24 PROCEDURE — 74011250637 HC RX REV CODE- 250/637: Performed by: PSYCHIATRY & NEUROLOGY

## 2022-04-24 PROCEDURE — 74011636637 HC RX REV CODE- 636/637: Performed by: INTERNAL MEDICINE

## 2022-04-24 PROCEDURE — 82962 GLUCOSE BLOOD TEST: CPT

## 2022-04-24 PROCEDURE — 74011250637 HC RX REV CODE- 250/637: Performed by: INTERNAL MEDICINE

## 2022-04-24 PROCEDURE — 65270000046 HC RM TELEMETRY

## 2022-04-24 RX ORDER — CLOPIDOGREL BISULFATE 75 MG/1
75 TABLET ORAL DAILY
Qty: 19 TABLET | Refills: 0 | Status: SHIPPED
Start: 2022-04-25 | End: 2022-05-14

## 2022-04-24 RX ORDER — GUAIFENESIN 100 MG/5ML
81 LIQUID (ML) ORAL DAILY
Qty: 30 TABLET | Refills: 0 | Status: SHIPPED
Start: 2022-04-25

## 2022-04-24 RX ORDER — ATORVASTATIN CALCIUM 40 MG/1
40 TABLET, FILM COATED ORAL
Qty: 30 TABLET | Refills: 0 | Status: SHIPPED
Start: 2022-04-24

## 2022-04-24 RX ORDER — METFORMIN HYDROCHLORIDE 500 MG/1
500 TABLET ORAL 2 TIMES DAILY WITH MEALS
Qty: 60 TABLET | Refills: 0 | Status: SHIPPED
Start: 2022-04-24

## 2022-04-24 RX ADMIN — ASPIRIN 81 MG CHEWABLE TABLET 81 MG: 81 TABLET CHEWABLE at 08:30

## 2022-04-24 RX ADMIN — CLOPIDOGREL BISULFATE 75 MG: 75 TABLET ORAL at 08:30

## 2022-04-24 RX ADMIN — Medication 2 UNITS: at 11:37

## 2022-04-24 RX ADMIN — ATORVASTATIN CALCIUM 40 MG: 40 TABLET, FILM COATED ORAL at 21:46

## 2022-04-24 RX ADMIN — Medication 2 UNITS: at 16:42

## 2022-04-24 NOTE — PROGRESS NOTES
[facility] [facility] Adult  Hospitalist Group                                                                                          Hospitalist Progress Note  [address] [facility], DO  Answering service: [phone] from in house phone        Date of Service:  2022  NAME:  Humberto Cuevas  :  1935  MRN:  [id number]      Admission Summary:   Humberto Cuevas with past medical history SBO, right eye blindness, presenting to [facility] with 2 day history slurred speech and right sided deficits. History is limited due to patient's speech issues. He is able to state that is symptoms started 2 days prior. He has had limited appetite. He reports that he was unable to ambulate to the car. EMS was notified. In the ED, CT head without acute findings. He was not a candidate for tPA due to symptom onset. Hospitalist was consulted for further admission. Interval history / Subjective: Follow up CVA. Patient seen and examined. In bed. No acute changes      Assessment & Plan:     Dysarthria and right sided weakness due to Acute CVA  -CT noncontrast head negative  -MRI brain with 2 small areas of acute infarction in the left basal ganglia  -CTA head/neck w/ severe stenosis posterior division M2 and mod-severe stenosis proximal P2 segment posterior cerebral artery on the right.  -Neurology consulted.  Will ask to review imaging for further recommendations  -monitor on telemetry  -neurovascular checks  -echocardiogram with EF 55-60%   -speech evaluation - dysphagia diet   -PT/OT- suspect will need rehab  -.2- initiated on high intensity statin  -Ha1c 7.4  -continue aspirin, plavix x 21 days, then asa daily      Diabetes mellitus, Type II  -continue SSI while inpatient  -transition to metformin on discharge     Acute renal insufficiency: improved  -s/p fluids            Code status: full   Prophylaxis: SCDs  Care Plan discussed with: patient, nurse  Anticipated Disposition: 1-2 days, IPR Hospital Problems  Never Reviewed          Codes Class Noted POA    Cerebrovascular accident (CVA) due to thrombosis of left middle cerebral artery [facility]) ICD-10-CM: J98.489  ICD-9-CM: 434.01  4/23/2022 Unknown        Intracranial atherosclerosis ICD-10-CM: S52.4  ICD-9-CM: 437.0  4/23/2022 Unknown                Review of Systems:   Negative unless stated above      Vital Signs:    Last 24hrs VS reviewed since prior progress note. Most recent are:  Visit Vitals  /77 (BP 1 Location: Right arm, BP Patient Position: At rest)   Pulse 65   Temp 98.3 °F (36.8 °C)   Resp 19   Ht 5' 8\" (1.727 m)   Wt 88.9 kg (196 lb)   SpO2 97%   BMI 29.80 kg/m²         Intake/Output Summary (Last 24 hours) at 4/24/2022 1646  Last data filed at 4/24/2022 1600  Gross per 24 hour   Intake 550 ml   Output    Net 550 ml        Physical Examination:     I had a face to face encounter with this patient and independently examined them on 4/24/2022 as outlined below:          Constitutional:  No acute distress, cooperative, pleasant    ENT:  Oral mucosa moist, oropharynx benign. Resp:  CTA bilaterally. No wheezing/rhonchi/rales. No accessory muscle use. CV:  Regular rhythm, normal rate, no murmurs, gallops, rubs    GI:  Soft, non distended, non tender. normoactive bowel sounds, no hepatosplenomegaly     Musculoskeletal:  No edema, warm, 2+ pulses throughout    Neurologic:  dysarthria, slight right facial droop, 4/5 upper extremity strength             Data Review:    Review and/or order of clinical lab test  Review and/or order of tests in the radiology section of CPT  Review and/or order of tests in the medicine section of CPT      Labs:     No results for input(s): WBC, HGB, HCT, PLT, HGBEXT, HCTEXT, PLTEXT, HGBEXT, HCTEXT, PLTEXT in the last 72 hours.   Recent Labs     04/22/22  0649      K 4.0      CO2 25   BUN 18   CREA 1.02   *   CA 8.7     No results for input(s): ALT, AP, TBIL, TBILI, TP, ALB, GLOB, GGT, AML, LPSE in the last 72 hours. No lab exists for component: SGOT, GPT, AMYP, HLPSE  No results for input(s): INR, PTP, APTT, INREXT, INREXT in the last 72 hours. No results for input(s): FE, TIBC, PSAT, FERR in the last 72 hours. No results found for: FOL, RBCF   No results for input(s): PH, PCO2, PO2 in the last 72 hours. No results for input(s): CPK, CKNDX, TROIQ in the last 72 hours.     No lab exists for component: CPKMB  Lab Results   Component Value Date/Time    Cholesterol, total 205 (H) 04/22/2022 06:49 AM    HDL Cholesterol 53 04/22/2022 06:49 AM    LDL, calculated 139.2 (H) 04/22/2022 06:49 AM    Triglyceride 64 04/22/2022 06:49 AM    CHOL/HDL Ratio 3.9 04/22/2022 06:49 AM     Lab Results   Component Value Date/Time    Glucose (POC) 157 (H) 04/24/2022 04:34 PM    Glucose (POC) 140 (H) 04/24/2022 11:32 AM    Glucose (POC) 117 04/24/2022 07:21 AM    Glucose (POC) 101 04/23/2022 09:29 PM    Glucose (POC) 142 (H) 04/23/2022 05:38 PM     No results found for: COLOR, APPRN, SPGRU, REFSG, DOMENICO, PROTU, GLUCU, KETU, BILU, UROU, CORBY, LEUKU, GLUKE, EPSU, BACTU, WBCU, RBCU, CASTS, UCRY      Medications Reviewed:     Current Facility-Administered Medications   Medication Dose Route Frequency    clopidogreL (PLAVIX) tablet 75 mg  75 mg Oral DAILY    acetaminophen (TYLENOL) tablet 650 mg  650 mg Oral Q4H PRN    Or    acetaminophen (TYLENOL) solution 650 mg  650 mg Per NG tube Q4H PRN    Or    acetaminophen (TYLENOL) suppository 650 mg  650 mg Rectal Q4H PRN    aspirin chewable tablet 81 mg  81 mg Oral DAILY    atorvastatin (LIPITOR) tablet 40 mg  40 mg Oral QHS    polyethylene glycol (MIRALAX) packet 17 g  17 g Oral DAILY PRN    glucose chewable tablet 16 g  4 Tablet Oral PRN    dextrose 10 % infusion 0-250 mL  0-250 mL IntraVENous PRN    glucagon (GLUCAGEN) injection 1 mg  1 mg IntraMUSCular PRN    insulin lispro (HUMALOG) injection   SubCUTAneous AC&HS ______________________________________________________________________  EXPECTED LENGTH OF STAY: 2d 0h  ACTUAL LENGTH OF STAY:          [address] [place]

## 2022-04-25 VITALS
HEART RATE: 58 BPM | TEMPERATURE: 97.4 F | WEIGHT: 196 LBS | HEIGHT: 68 IN | BODY MASS INDEX: 29.7 KG/M2 | SYSTOLIC BLOOD PRESSURE: 139 MMHG | OXYGEN SATURATION: 95 % | DIASTOLIC BLOOD PRESSURE: 65 MMHG | RESPIRATION RATE: 13 BRPM

## 2022-04-25 LAB
GLUCOSE BLD STRIP.AUTO-MCNC: 142 MG/DL (ref 65–117)
GLUCOSE BLD STRIP.AUTO-MCNC: 163 MG/DL (ref 65–117)
SERVICE CMNT-IMP: ABNORMAL
SERVICE CMNT-IMP: ABNORMAL

## 2022-04-25 PROCEDURE — 74011636637 HC RX REV CODE- 636/637: Performed by: INTERNAL MEDICINE

## 2022-04-25 PROCEDURE — 97116 GAIT TRAINING THERAPY: CPT

## 2022-04-25 PROCEDURE — 82962 GLUCOSE BLOOD TEST: CPT

## 2022-04-25 PROCEDURE — 74011250637 HC RX REV CODE- 250/637: Performed by: INTERNAL MEDICINE

## 2022-04-25 PROCEDURE — 74011250637 HC RX REV CODE- 250/637: Performed by: PSYCHIATRY & NEUROLOGY

## 2022-04-25 PROCEDURE — 97530 THERAPEUTIC ACTIVITIES: CPT

## 2022-04-25 RX ADMIN — Medication 2 UNITS: at 09:31

## 2022-04-25 RX ADMIN — Medication 2 UNITS: at 12:10

## 2022-04-25 RX ADMIN — CLOPIDOGREL BISULFATE 75 MG: 75 TABLET ORAL at 09:31

## 2022-04-25 RX ADMIN — ASPIRIN 81 MG CHEWABLE TABLET 81 MG: 81 TABLET CHEWABLE at 09:31

## 2022-04-25 NOTE — DISCHARGE SUMMARY
Discharge Summary       PATIENT ID: Chilango Berrios  MRN: [id number]   YOB: 1935    DATE OF ADMISSION: 4/21/2022  2:13 PM    DATE OF DISCHARGE: 4/25/2022   PRIMARY CARE PROVIDER: Stevie Lopez MD     ATTENDING PHYSICIAN: Nena Joseph DO   DISCHARGING PROVIDER: Nena Joseph DO    To contact this individual call [phone] and ask the  to page. If unavailable ask to be transferred the Adult Hospitalist Department. CONSULTATIONS: IP CONSULT TO NEUROLOGY    PROCEDURES/SURGERIES: * No surgery found *    ADMISSION SUMMARY AND HOSPITAL COURSE:   Admission History:  \"Miguel Brooks with past medical history SBO, right eye blindness, presenting to [facility] with 2 day history slurred speech and right sided deficits. History is limited due to patient's speech issues. He is able to state that is symptoms started 2 days prior. He has had limited appetite. He reports that he was unable to ambulate to the car. EMS was notified. In the ED, CT head without acute findings. He was not a candidate for tPA due to symptom onset. Hospitalist was consulted for further admission. \"    MRI brain:  IMPRESSION  2 small foci of acute infarction in the left basal ganglia. Mild to moderate chronic microvascular ischemic change and moderate temporal  predominant cerebral atrophy.     Echo:    Left Ventricle: Normal left ventricular systolic function with a visually estimated EF of 55 - 60%. Left ventricle size is normal. Normal wall thickness. Normal wall motion.   Left Atrium: Left atrium is moderately dilated. DISCHARGE DIAGNOSES / PLAN:      Dysarthria and right sided weakness due to Acute CVA  -CT noncontrast head negative  -MRI brain with 2 small areas of acute infarction in the left basal ganglia  -CTA head/neck w/ severe stenosis posterior division M2 and mod-severe stenosis proximal P2 segment posterior cerebral artery on the right.  -Neurology consulted.  Appreciate recommendations  -echocardiogram with EF 55-60%   -speech evaluation - dysphagia diet   -PT/OT- recommend Inpatient rehab. Case management consulted  -.2- initiated on high intensity statin  -Ha1c 7.4  -continue aspirin, plavix x 21 days (end date 5/14/2022), then asa daily      Diabetes mellitus, Type II  -continue SSI while inpatient  -transition to metformin on discharge     Acute renal insufficiency: improved  -s/p fluids       BMI: Body mass index is 29.8 kg/m². . This patient: Meets criteria for overweight given BMI >/= 25 and < 30 due to excess calories/nutritional. Weight loss and lifestyle modifications should be encouraged as an outpatient. PENDING TEST RESULTS:   At the time of discharge the following test results are still pending: none     ADDITIONAL CARE RECOMMENDATIONS:   New medications:   1. Aspirin. Take 81 mg once daily  2. Clopidogrel/Plavix. Take 75 mg once daily for 21 days, then stop. End date 5/14/2022  3. Lipitor/Atorvastatin. Take once daily for cholesterol  4. Metformin. Take twice daily for diabetes management     DIET: Dysphagia diet, Soft and Bite sized, Carbohydrate restricted (60 gm per meal)    ACTIVITY: as tolerated    NOTIFY YOUR PHYSICIAN FOR ANY OF THE FOLLOWING:   Fever over 101 degrees for 24 hours. Chest pain, shortness of breath, fever, chills, nausea, vomiting, diarrhea, change in mentation, falling, weakness, bleeding. Severe pain or pain not relieved by medications, as well as any other signs or symptoms that you may have questions about. FOLLOW UP APPOINTMENTS:    Follow-up Information     Follow up With Specialties Details Why Contact Info    Other, MD Stevie    Patient can only remember the practice name and not the physician             DISCHARGE MEDICATIONS:  Current Discharge Medication List      START taking these medications    Details   aspirin 81 mg chewable tablet Take 1 Tablet by mouth daily.   Qty: 30 Tablet, Refills: 0  Start date: 4/25/2022 atorvastatin (LIPITOR) 40 mg tablet Take 1 Tablet by mouth nightly. Qty: 30 Tablet, Refills: 0  Start date: 4/24/2022      clopidogreL (PLAVIX) 75 mg tab Take 1 Tablet by mouth daily for 19 days. Qty: 19 Tablet, Refills: 0  Start date: 4/25/2022, End date: 5/14/2022      metFORMIN (GLUCOPHAGE) 500 mg tablet Take 1 Tablet by mouth two (2) times daily (with meals). Qty: 60 Tablet, Refills: 0  Start date: 4/24/2022         STOP taking these medications       HYDROcodone-acetaminophen (LORTAB) 5-500 mg per tablet Comments:   Reason for Stopping:               DISPOSITION:    Home With:   OT  PT  [place]  RN      x Inpatient Rehab    Independent/assisted living    Hospice    Other:       PATIENT CONDITION AT DISCHARGE:     Functional status    Poor    x Deconditioned     Independent      Cognition   x  Lucid     Forgetful     Dementia      Catheters/lines (plus indication)    [place]     PICC     PEG    x None      Code status   x  Full code     DNR      PHYSICAL EXAMINATION AT DISCHARGE:  Constitutional:  No acute distress, cooperative, pleasant    ENT:  Oral mucosa moist, oropharynx benign. Resp:  CTA bilaterally. No wheezing/rhonchi/rales. No accessory muscle use. CV:  Regular rhythm, normal rate, no murmurs, gallops, rubs    GI:  Soft, non distended, non tender.  normoactive bowel sounds, no hepatosplenomegaly     Musculoskeletal:  No edema, warm, 2+ pulses throughout    Neurologic:  slight dysarthria, slight right facial droop, minimal right upper extremity weakness (almost completely resolved) 4/5                                   CHRONIC MEDICAL DIAGNOSES:  Problem List as of 4/25/2022 Never Reviewed          Codes Class Noted - Resolved    Cerebrovascular accident (CVA) due to thrombosis of left middle cerebral artery ([facility] [address] ICD-10-CM: M89.330  ICD-9-CM: 434.01  4/23/2022 - Present        Intracranial atherosclerosis ICD-10-CM: J97.8  ICD-9-CM: 437.0  4/23/2022 - Present        Slurred speech ICD-10-CM: R47.81  ICD-9-CM: 784.59  4/21/2022 - Present              Greater than 30 minutes were spent with the patient on counseling and coordination of care    Signed:   Suman Rizzo DO  4/25/2022  8:40 AM

## 2022-04-25 NOTE — PROGRESS NOTES
Problem: Mobility Impaired (Adult and Pediatric)  Goal: *Acute Goals and Plan of Care (Insert Text)  Description: FUNCTIONAL STATUS PRIOR TO ADMISSION: Pt reports indep, active baseline. Drives, goes to gym where he enjoys the hot tub and steam room. 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 HOME SUPPORT PRIOR TO ADMISSION: Pt lives alone    Physical Therapy Goals  Initiated 4/22/2022  1. Patient will move from supine to sit and sit to supine  in bed with supervision/set-up within 7 day(s). 2.  Patient will transfer from bed to chair and chair to bed with contact guard assist using the least restrictive device within 7 day(s). 3.  Patient will perform sit to stand with contact guard assist within 7 day(s). 4.  Patient will ambulate with minimal assistance/contact guard assist for at least 50 feet with the least restrictive device within 7 day(s). 5.  Patient will ascend/descend 3 stairs with bilat handrail(s) with minimal assistance/contact guard assist within 7 day(s). Outcome: Progressing Towards Goal   PHYSICAL THERAPY TREATMENT  Patient: Felipe Ahumada [de-identified]80 y.o. male)  Date: 4/25/2022  Diagnosis: Slurred speech [R47.81] <principal problem not specified>       Precautions:    Chart, physical therapy assessment, plan of care and goals were reviewed. ASSESSMENT  Patient continues with skilled PT services and is progressing towards goals. Barriers to indep with functional mobility include mild R weakness, decreased standing balance, decreased safety awareness, gait dysfunction, increased fall risk. Note continued dysarthria, but improving. Pt eager to mobilize. Able to reach EOB with increased time/ effort and CGA. Pt required min A for balance with transfers. Tolerated amb onto unit with min A for balance, cues for safety due to decreased stability with turning, decreased safety awareness. Pt agreed to sit in chair at end of session.  Reviewed fall risk and need to call for assist before getting up, alarm activated. Pt remains below functional baseline. Will benefit from follow up IPR at d/c to facilitate pt return to modified indep LOF. Current Level of Function Impacting Discharge (mobility/balance): min A transfers and amb    Other factors to consider for discharge: unsafe to return home alone         PLAN :  Patient continues to benefit from skilled intervention to address the above impairments. Continue treatment per established plan of care. to address goals. Recommendation for discharge: (in order for the patient to meet his/her long term goals)  Therapy 3 hours per day 5-7 days per week    This discharge recommendation:  Has been made in collaboration with the attending provider and/or case management    IF patient discharges home will need the following DME: to be determined (TBD)       SUBJECTIVE:   Patient stated I'm doing better    OBJECTIVE DATA SUMMARY:   Critical Behavior:  Neurologic State: Alert  Orientation Level: Oriented X4  Cognition: Appropriate for age attention/concentration,Follows commands  Safety/Judgement: Awareness of environment  Functional Mobility Training:  Bed Mobility:     Supine to Sit: Contact guard assistance; Additional time     Scooting: Supervision; Additional time (for scoot to EOB)        Transfers:  Sit to Stand: Minimum assistance  Stand to Sit: Minimum assistance;Contact guard assistance                             Balance:  Sitting: Impaired  Sitting - Static: Good (unsupported)  Sitting - Dynamic: Fair (occasional)  Standing: Impaired  Standing - Static: Good;Constant support  Standing - Dynamic : Fair;Constant support  Ambulation/Gait Training:  Distance (ft): 50 Feet (ft)  Assistive Device: Gait belt  Ambulation - Level of Assistance: Minimal assistance        Gait Abnormalities: Decreased step clearance; Path deviations              Speed/Shania: Pace decreased (<100 feet/min)  Step Length: Left shortened;Right shortened         Pain Rating:  Pt denies c/o pain    Activity Tolerance:   Good    After treatment patient left in no apparent distress:   Sitting in chair, Heels elevated for pressure relief, Call bell within reach and Bed / chair alarm activated    COMMUNICATION/COLLABORATION:   The patients plan of care was discussed with: Registered nurse.      Chyna Duarte, PT   Time Calculation: 23 mins

## 2022-04-25 NOTE — PROGRESS NOTES
Problem: Discharge Planning  Goal: *Discharge to safe environment  Outcome: Progressing Towards Goal     Problem: Falls - Risk of  Goal: *Absence of Falls  Description: Document Marciana Distance Fall Risk and appropriate interventions in the flowsheet.   Outcome: Progressing Towards Goal  Note: Fall Risk Interventions:  Mobility Interventions: Communicate number of staff needed for ambulation/transfer,Patient to call before getting OOB,PT Consult for mobility concerns         Medication Interventions: Patient to call before getting OOB    Elimination Interventions: Call light in reach,Patient to call for help with toileting needs    History of Falls Interventions: Door open when patient unattended,Evaluate medications/consider consulting pharmacy,Room close to nurse's station

## 2022-04-25 NOTE — PROGRESS NOTES
Problem: Discharge Planning  Goal: *Discharge to safe environment  Outcome: Progressing Towards Goal     Problem: Diabetes Self-Management  Goal: *Monitoring blood glucose, interpreting and using results  Description: Identify recommended blood glucose targets  and personal targets. Outcome: Progressing Towards Goal     Problem: Falls - Risk of  Goal: *Absence of Falls  Description: Document Daniel Balderas Fall Risk and appropriate interventions in the flowsheet.   4/24/2022 2316 by Padmaja Bill RN  Outcome: Progressing Towards Goal  Note: Fall Risk Interventions:  Mobility Interventions: Communicate number of staff needed for ambulation/transfer,Patient to call before getting OOB,PT Consult for mobility concerns         Medication Interventions: Patient to call before getting OOB    Elimination Interventions: Call light in reach,Patient to call for help with toileting needs    History of Falls Interventions: Door open when patient unattended,Evaluate medications/consider consulting pharmacy,Room close to nurse's station      4/24/2022 2313 by Padmaja Bill RN  Outcome: Progressing Towards Goal  Note: Fall Risk Interventions:  Mobility Interventions: Communicate number of staff needed for ambulation/transfer,Patient to call before getting OOB,PT Consult for mobility concerns         Medication Interventions: Patient to call before getting OOB    Elimination Interventions: Call light in reach,Patient to call for help with toileting needs    History of Falls Interventions: Door open when patient unattended,Evaluate medications/consider consulting pharmacy,Room close to nurse's station

## 2022-04-25 NOTE — DISCHARGE INSTRUCTIONS
Discharge Instructions       PATIENT ID: Amy Bhardwaj  MRN: [id number]   YOB: 1935    DATE OF ADMISSION: 4/21/2022  2:13 PM    DATE OF DISCHARGE: 4/24/2022    PRIMARY CARE PROVIDER: Stevie Lopez MD     ATTENDING PHYSICIAN: Rosenda Hernandez DO  DISCHARGING PROVIDER: [address] [place] [address]     To contact this individual call [phone] and ask the  to page. If unavailable ask to be transferred the Adult Hospitalist Department. DISCHARGE DIAGNOSES *    Acute stroke  Diabetes mellitus, Type II    CONSULTATIONS: IP CONSULT TO NEUROLOGY    PROCEDURES/SURGERIES: * No surgery found *    PENDING TEST RESULTS:   At the time of discharge the following test results are still pending: none    FOLLOW UP APPOINTMENTS:   Follow-up Information    None          ADDITIONAL CARE RECOMMENDATIONS:     New medications:   1. Aspirin. Take 81 mg once daily  2. Clopidogrel/Plavix. Take 75 mg once daily for 21 days, then stop. End date 5/14/2022  3. Lipitor/Atorvastatin. Take once daily for cholesterol  4. Metformin. Take twice daily for diabetes management     DIET: Dysphagia diet, Soft and Bite sized, Carbohydrate restricted (60 gm per meal)    ACTIVITY: as tolerated      DISCHARGE MEDICATIONS:   See Medication Reconciliation Form    · It is important that you take the medication exactly as they are prescribed. · Keep your medication in the bottles provided by the pharmacist and keep a list of the medication names, dosages, and times to be taken in your wallet. · Do not take other medications without consulting your doctor. NOTIFY YOUR PHYSICIAN FOR ANY OF THE FOLLOWING:   Fever over 101 degrees for 24 hours. Chest pain, shortness of breath, fever, chills, nausea, vomiting, diarrhea, change in mentation, falling, weakness, bleeding. Severe pain or pain not relieved by medications. Or, any other signs or symptoms that you may have questions about.       Signed:   [address] [place] [address] DO  4/24/2022  6:26 PM

## 2022-04-25 NOTE — PROGRESS NOTES
Transition of Care Plan: I[facility]  RN to call report to [phone]     RUR: 7% low     [facility]     Disposition: [id number]     Transportation: [facility]     Main Contact: POA (per patient)-Juni INFANTERTCPOITA-[phone]  Friend-Jewell NietoGygu-[phone]    0947: Lashaun Shine [facility] denied due to bed availability. Referral sent to [facility] as it was patient's second choice. Referral pending    [phone]: Spoke with patient. Agreeable for this CM to send referral to [facility]. Referral pending. 1111: [facility] accepted for IPR. Requested 3pm transport time. [facility] confirmed. [facility] information pending. 1152: Patient notified of discharge plan to [facility]. 1220: [facility] information received. Packet placed on chart. Received telephone call from [address] [facility] stating that they are still on med-surg diversion. Informed them that patient is discharging to rehab. Wants discharge summary faxed to [facility] at [phone]. Fax sent.      Rosalie Jiménez RN, CRM

## 2022-04-25 NOTE — PROGRESS NOTES
TRANSFER - OUT REPORT:    Verbal report given to RN (name) on Vianey Ortega  being transferred to [facility]. Report consisted of patients Situation, Background, Assessment and   Recommendations(SBAR). Information from the following report(s) SBAR, Kardex, Intake/Output, MAR, Recent Results and Med Rec Status was reviewed with the receiving nurse. Opportunity for questions and clarification was provided. Patient transported with:  Patient belongings. I have reviewed discharge instructions with the patient. The patient verbalized understanding. [facility]  for 1500.

## 2022-04-25 NOTE — PROGRESS NOTES
Problem: Discharge Planning  Goal: *Discharge to safe environment  Outcome: Progressing Towards Goal  Goal: *Knowledge of medication management  Outcome: Progressing Towards Goal  Goal: *Knowledge of discharge instructions  Outcome: Progressing Towards Goal     Problem: Patient Education: Go to Patient Education Activity  Goal: Patient/Family Education  Outcome: Progressing Towards Goal     Problem: Patient Education: Go to Patient Education Activity  Goal: Patient/Family Education  Outcome: Progressing Towards Goal     Problem: Patient Education: Go to Patient Education Activity  Goal: Patient/Family Education  Outcome: Progressing Towards Goal     Problem: Diabetes Self-Management  Goal: *Disease process and treatment process  Description: Define diabetes and identify own type of diabetes; list 3 options for treating diabetes. Outcome: Progressing Towards Goal  Goal: *Incorporating nutritional management into lifestyle  Description: Describe effect of type, amount and timing of food on blood glucose; list 3 methods for planning meals. Outcome: Progressing Towards Goal  Goal: *Incorporating physical activity into lifestyle  Description: State effect of exercise on blood glucose levels. Outcome: Progressing Towards Goal  Goal: *Developing strategies to promote health/change behavior  Description: Define the ABC's of diabetes; identify appropriate screenings, schedule and personal plan for screenings. Outcome: Progressing Towards Goal  Goal: *Using medications safely  Description: State effect of diabetes medications on diabetes; name diabetes medication taking, action and side effects. Outcome: Progressing Towards Goal  Goal: *Monitoring blood glucose, interpreting and using results  Description: Identify recommended blood glucose targets  and personal targets.   Outcome: Progressing Towards Goal  Goal: *Prevention, detection, treatment of acute complications  Description: List symptoms of hyper- and hypoglycemia; describe how to treat low blood sugar and actions for lowering  high blood glucose level. Outcome: Progressing Towards Goal  Goal: *Prevention, detection and treatment of chronic complications  Description: Define the natural course of diabetes and describe the relationship of blood glucose levels to long term complications of diabetes. Outcome: Progressing Towards Goal  Goal: *Developing strategies to address psychosocial issues  Description: Describe feelings about living with diabetes; identify support needed and support network  Outcome: Progressing Towards Goal  Goal: *Insulin pump training  Outcome: Progressing Towards Goal  Goal: *Sick day guidelines  Outcome: Progressing Towards Goal  Goal: *Patient Specific Goal (EDIT GOAL, INSERT TEXT)  Outcome: Progressing Towards Goal     Problem: Patient Education: Go to Patient Education Activity  Goal: Patient/Family Education  Outcome: Progressing Towards Goal     Problem: Patient Education: Go to Patient Education Activity  Goal: Patient/Family Education  Outcome: Progressing Towards Goal     Problem: Falls - Risk of  Goal: *Absence of Falls  Description: Document Bernabe Brenda Fall Risk and appropriate interventions in the flowsheet.   Outcome: Progressing Towards Goal  Note: Fall Risk Interventions:  Mobility Interventions: Bed/chair exit alarm,Communicate number of staff needed for ambulation/transfer,OT consult for ADLs,Patient to call before getting OOB,PT Consult for mobility concerns,PT Consult for assist device competence,Strengthening exercises (ROM-active/passive),Utilize gait belt for transfers/ambulation         Medication Interventions: Assess postural VS orthostatic hypotension,Evaluate medications/consider consulting pharmacy,Bed/chair exit alarm,Patient to call before getting OOB,Teach patient to arise slowly,Utilize gait belt for transfers/ambulation    Elimination Interventions: Bed/chair exit alarm,Call light in reach,Stay With Me (per policy),Toilet paper/wipes in reach,Toileting schedule/hourly rounds,Urinal in reach    History of Falls Interventions: Bed/chair exit alarm,Consult care management for discharge planning,Door open when patient unattended,Room close to nurse's station,Evaluate medications/consider consulting pharmacy         Problem: Patient Education: Go to Patient Education Activity  Goal: Patient/Family Education  Outcome: Progressing Towards Goal     Problem: Patient Education: Go to Patient Education Activity  Goal: Patient/Family Education  Outcome: Progressing Towards Goal     Problem: Patient Education: Go to Patient Education Activity  Goal: Patient/Family Education  Outcome: Progressing Towards Goal     Problem: TIA/CVA Stroke: Day 2 Until Discharge  Goal: Off Pathway (Use only if patient is Off Pathway)  Outcome: Progressing Towards Goal  Goal: Activity/Safety  Outcome: Progressing Towards Goal  Goal: Diagnostic Test/Procedures  Outcome: Progressing Towards Goal  Goal: Nutrition/Diet  Outcome: Progressing Towards Goal  Goal: Discharge Planning  Outcome: Progressing Towards Goal  Goal: Medications  Outcome: Progressing Towards Goal  Goal: Respiratory  Outcome: Progressing Towards Goal  Goal: Treatments/Interventions/Procedures  Outcome: Progressing Towards Goal  Goal: Psychosocial  Outcome: Progressing Towards Goal  Goal: *Verbalizes anxiety and depression are reduced or absent  Outcome: Progressing Towards Goal  Goal: *Absence of aspiration  Outcome: Progressing Towards Goal  Goal: *Absence of deep venous thrombosis signs and symptoms(Stroke Metric)  Outcome: Progressing Towards Goal  Goal: *Optimal pain control at patient's stated goal  Outcome: Progressing Towards Goal  Goal: *Tolerating diet  Outcome: Progressing Towards Goal  Goal: *Ability to perform ADLs and demonstrates progressive mobility and function  Outcome: Progressing Towards Goal  Goal: *Stroke education continued(Stroke Metric)  Outcome: Progressing Towards Goal     Problem: Ischemic Stroke: Discharge Outcomes  Goal: *Verbalizes anxiety and depression are reduced or absent  Outcome: Progressing Towards Goal  Goal: *Verbalize understanding of risk factor modification(Stroke Metric)  Outcome: Progressing Towards Goal  Goal: *Hemodynamically stable  Outcome: Progressing Towards Goal  Goal: *Absence of aspiration pneumonia  Outcome: Progressing Towards Goal  Goal: *Aware of needed dietary changes  Outcome: Progressing Towards Goal  Goal: *Verbalize understanding of prescribed medications including anti-coagulants, anti-lipid, and/or anti-platelets(Stroke Metric)  Outcome: Progressing Towards Goal  Goal: *Tolerating diet  Outcome: Progressing Towards Goal  Goal: *Aware of follow-up diagnostics related to anticoagulants  Outcome: Progressing Towards Goal  Goal: *Ability to perform ADLs and demonstrates progressive mobility and function  Outcome: Progressing Towards Goal  Goal: *Absence of DVT(Stroke Metric)  Outcome: Progressing Towards Goal  Goal: *Absence of aspiration  Outcome: Progressing Towards Goal  Goal: *Optimal pain control at patient's stated goal  Outcome: Progressing Towards Goal  Goal: *Home safety concerns addressed  Outcome: Progressing Towards Goal  Goal: *Describes available resources and support systems  Outcome: Progressing Towards Goal  Goal: *Verbalizes understanding of activation of EMS(911) for stroke symptoms(Stroke Metric)  Outcome: Progressing Towards Goal  Goal: *Understands and describes signs and symptoms to report to providers(Stroke Metric)  Outcome: Progressing Towards Goal  Goal: *Neurolgocially stable (absence of additional neurological deficits)  Outcome: Progressing Towards Goal  Goal: *Verbalizes importance of follow-up with primary care physician(Stroke Metric)  Outcome: Progressing Towards Goal  Goal: *Smoking cessation discussed,if applicable(Stroke Metric)  Outcome: Progressing Towards Goal  Goal: *Depression screening completed(Stroke Metric)  Outcome: Progressing Towards Goal

## 2022-05-31 NOTE — ROUTINE PROCESS
TRANSFER - OUT REPORT:    Verbal report given Buck RN on [employer]. Mark Chun  being transferred to [phone] for routine progression of care       Report consisted of patients Situation, Background, Assessment and   Recommendations(SBAR). Information from the following report(s) SBAR, Kardex, ED Summary, Intake/Output, MAR, Recent Results, Cardiac Rhythm SR w/1 AVB, Quality Measures and Dual Neuro Assessment was reviewed with the receiving nurse. Lines:   Peripheral IV 04/21/22 Left Antecubital (Active)   Site Assessment Clean, dry, & intact 04/21/22 1430   Phlebitis Assessment 0 04/21/22 1430   Infiltration Assessment 0 04/21/22 1430   Dressing Status Clean, dry, & intact 04/21/22 1430   Hub Color/Line Status Green 04/21/22 1430        Opportunity for questions and clarification was provided.       Patient transported with:   Transport rales/wheezes rales/rhonchi

## 2023-05-19 RX ORDER — ATORVASTATIN CALCIUM 40 MG/1
1 TABLET, FILM COATED ORAL NIGHTLY
COMMUNITY
Start: 2022-04-24

## 2023-05-19 RX ORDER — ASPIRIN 81 MG/1
81 TABLET, CHEWABLE ORAL DAILY
COMMUNITY
Start: 2022-04-25